# Patient Record
Sex: FEMALE | Race: WHITE
[De-identification: names, ages, dates, MRNs, and addresses within clinical notes are randomized per-mention and may not be internally consistent; named-entity substitution may affect disease eponyms.]

---

## 2017-01-12 ENCOUNTER — HOSPITAL ENCOUNTER (OUTPATIENT)
Dept: HOSPITAL 45 - C.LABBC | Age: 62
Discharge: HOME | End: 2017-01-12
Attending: INTERNAL MEDICINE
Payer: COMMERCIAL

## 2017-01-12 DIAGNOSIS — I10: Primary | ICD-10-CM

## 2017-01-12 LAB
ANION GAP SERPL CALC-SCNC: 14 MMOL/L (ref 3–11)
BUN SERPL-MCNC: 15 MG/DL (ref 7–18)
BUN/CREAT SERPL: 16.5 (ref 10–20)
CALCIUM SERPL-MCNC: 9.5 MG/DL (ref 8.5–10.1)
CHLORIDE SERPL-SCNC: 87 MMOL/L (ref 98–107)
CO2 SERPL-SCNC: 28 MMOL/L (ref 21–32)
CREAT SERPL-MCNC: 0.91 MG/DL (ref 0.6–1.2)
GLUCOSE SERPL-MCNC: 99 MG/DL (ref 70–99)
POTASSIUM SERPL-SCNC: 3.8 MMOL/L (ref 3.5–5.1)
SODIUM SERPL-SCNC: 129 MMOL/L (ref 136–145)

## 2017-01-20 ENCOUNTER — HOSPITAL ENCOUNTER (OUTPATIENT)
Dept: HOSPITAL 45 - C.LABBC | Age: 62
Discharge: HOME | End: 2017-01-20
Attending: INTERNAL MEDICINE
Payer: COMMERCIAL

## 2017-01-20 DIAGNOSIS — I10: Primary | ICD-10-CM

## 2017-01-20 LAB
ANION GAP SERPL CALC-SCNC: 12 MMOL/L (ref 3–11)
BUN SERPL-MCNC: 11 MG/DL (ref 7–18)
BUN/CREAT SERPL: 14.9 (ref 10–20)
CALCIUM SERPL-MCNC: 8.7 MG/DL (ref 8.5–10.1)
CHLORIDE SERPL-SCNC: 99 MMOL/L (ref 98–107)
CO2 SERPL-SCNC: 26 MMOL/L (ref 21–32)
CREAT SERPL-MCNC: 0.75 MG/DL (ref 0.6–1.2)
GLUCOSE SERPL-MCNC: 157 MG/DL (ref 70–99)
POTASSIUM SERPL-SCNC: 3.5 MMOL/L (ref 3.5–5.1)
SODIUM SERPL-SCNC: 137 MMOL/L (ref 136–145)

## 2017-04-20 ENCOUNTER — HOSPITAL ENCOUNTER (OUTPATIENT)
Dept: HOSPITAL 45 - C.LABBC | Age: 62
Discharge: HOME | End: 2017-04-20
Attending: INTERNAL MEDICINE
Payer: COMMERCIAL

## 2017-04-20 DIAGNOSIS — I48.0: ICD-10-CM

## 2017-04-20 DIAGNOSIS — F32.9: ICD-10-CM

## 2017-04-20 DIAGNOSIS — R73.9: ICD-10-CM

## 2017-04-20 DIAGNOSIS — F10.10: ICD-10-CM

## 2017-04-20 DIAGNOSIS — Z79.899: ICD-10-CM

## 2017-04-20 DIAGNOSIS — I10: Primary | ICD-10-CM

## 2017-04-20 DIAGNOSIS — M48.06: ICD-10-CM

## 2017-04-20 LAB
ALT SERPL-CCNC: 23 U/L (ref 12–78)
ANION GAP SERPL CALC-SCNC: 11 MMOL/L (ref 3–11)
AST SERPL-CCNC: 31 U/L (ref 15–37)
BASOPHILS # BLD: 0.03 K/UL (ref 0–0.2)
BASOPHILS NFR BLD: 0.5 %
BUN SERPL-MCNC: 7 MG/DL (ref 7–18)
BUN/CREAT SERPL: 12.2 (ref 10–20)
CALCIUM SERPL-MCNC: 9.1 MG/DL (ref 8.5–10.1)
CHLORIDE SERPL-SCNC: 99 MMOL/L (ref 98–107)
CO2 SERPL-SCNC: 25 MMOL/L (ref 21–32)
COMPLETE: YES
CREAT SERPL-MCNC: 0.55 MG/DL (ref 0.6–1.2)
EOSINOPHIL NFR BLD AUTO: 257 K/UL (ref 130–400)
GLUCOSE SERPL-MCNC: 88 MG/DL (ref 70–99)
HCT VFR BLD CALC: 36.8 % (ref 37–47)
IG%: 0.2 %
IMM GRANULOCYTES NFR BLD AUTO: 32.4 %
LYMPHOCYTES # BLD: 1.79 K/UL (ref 1.2–3.4)
MCH RBC QN AUTO: 34.3 PG (ref 25–34)
MCHC RBC AUTO-ENTMCNC: 34.2 G/DL (ref 32–36)
MCV RBC AUTO: 100.3 FL (ref 80–100)
MONOCYTES NFR BLD: 8.9 %
NEUTROPHILS # BLD AUTO: 1.1 %
NEUTROPHILS NFR BLD AUTO: 56.9 %
PMV BLD AUTO: 9.4 FL (ref 7.4–10.4)
POTASSIUM SERPL-SCNC: 3.6 MMOL/L (ref 3.5–5.1)
RBC # BLD AUTO: 3.67 M/UL (ref 4.2–5.4)
SODIUM SERPL-SCNC: 135 MMOL/L (ref 136–145)
TSH SERPL-ACNC: 0.74 UIU/ML (ref 0.3–4.5)
WBC # BLD AUTO: 5.52 K/UL (ref 4.8–10.8)

## 2017-07-11 ENCOUNTER — HOSPITAL ENCOUNTER (OUTPATIENT)
Dept: HOSPITAL 45 - C.MRIBC | Age: 62
Discharge: HOME | End: 2017-07-11
Attending: PSYCHIATRY & NEUROLOGY
Payer: COMMERCIAL

## 2017-07-11 DIAGNOSIS — I67.1: Primary | ICD-10-CM

## 2017-07-11 LAB
ANION GAP SERPL CALC-SCNC: 10 MMOL/L (ref 3–11)
BUN SERPL-MCNC: 9 MG/DL (ref 7–18)
BUN/CREAT SERPL: 12.3 (ref 10–20)
CALCIUM SERPL-MCNC: 9.5 MG/DL (ref 8.5–10.1)
CHLORIDE SERPL-SCNC: 97 MMOL/L (ref 98–107)
CO2 SERPL-SCNC: 27 MMOL/L (ref 21–32)
CREAT SERPL-MCNC: 0.72 MG/DL (ref 0.6–1.2)
GLUCOSE SERPL-MCNC: 76 MG/DL (ref 70–99)
POTASSIUM SERPL-SCNC: 3.5 MMOL/L (ref 3.5–5.1)
SODIUM SERPL-SCNC: 134 MMOL/L (ref 136–145)

## 2017-07-11 NOTE — DIAGNOSTIC IMAGING REPORT
MR ANGIOGRAM OF THE BRAIN



CLINICAL HISTORY:  Aneurysm.



COMPARISON STUDY: MR angiogram of the brain dated 11/20/2015.



TECHNIQUE: 3-D time-of-flight MR angiography of the intracranial circulation is

performed. 3-D tumble views are created and assessed. IV contrast was not

administered for this examination. The examination is modestly degraded by

motion artifact.



FINDINGS: There are large bilateral posterior communicating arteries with fetal

origin of the left posterior cerebral artery. The internal carotid arteries are

widely patent bilaterally, as are the anterior and middle cerebral arteries. The

vertebrobasilar system is diminutive. The vertebral arteries arteries and

basilar artery are diminutive. The posterior cerebral arteries are widely

patent. The vertebral arteries are codominant. There is a 5 mm aneurysm

identified arising from the supraclinoid left internal carotid artery on image

#147. No additional aneurysm is seen. There is no high-grade stenosis are focal

vessel cutoff seen throughout the intracranial circulation. The brain parenchyma

is normal as visualized.



IMPRESSION:



1. There is a 5 mm aneurysm identified arising from the supraclinoid left

internal carotid artery. This is similar in appearance to 11/20/2015.



2. No additional aneurysm is seen. The MR angiogram of the brain is otherwise

normal in appearance.







Electronically signed by:  Ruel Capellan M.D.

7/11/2017 11:27 AM



Dictated Date/Time:  7/11/2017 11:20 AM

## 2017-10-24 ENCOUNTER — HOSPITAL ENCOUNTER (OUTPATIENT)
Dept: HOSPITAL 45 - C.MAMM | Age: 62
Discharge: HOME | End: 2017-10-24
Attending: INTERNAL MEDICINE
Payer: COMMERCIAL

## 2017-10-24 DIAGNOSIS — Z12.31: Primary | ICD-10-CM
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
## 2017-10-24 DIAGNOSIS — S82.409A: Primary | ICD-10-CM

## 2017-10-24 NOTE — MAMMOGRAPHY REPORT
BILATERAL DIGITAL SCREENING MAMMOGRAM WITH CAD: 10/24/2017

CLINICAL HISTORY: Routine screening.  Patient has no complaints.  





TECHNIQUE: Bilateral CC and MLO views were obtained.  Current study was also evaluated with a Compute
r Aided Detection (CAD) system.  



COMPARISON: Comparison is made to exams dated:  7/27/2011 mammogram, 7/1/2010 ultrasound, 7/1/2010 ma
mmogram, 3/12/2010 mammogram - [facility], 6/20/2007, and 12/28/2004 mammogram - [facility].   



BREAST COMPOSITION:  There are scattered areas of fibroglandular density in both breasts.  



FINDINGS:  There is fluctuating nodularity in the breasts, most likely representing fluctuating cysts
. No suspicious spiculated or irregular mass, architectural distortion or cluster of suspicious micro
calcifications is seen.  



IMPRESSION:  ACR BI-RADS CATEGORY 2: BENIGN

There is no mammographic evidence of malignancy. A 1 year screening mammogram is recommended.  The pa
tient will receive written notification of the results.  





Approximately 10% of breast cancers are not detected with mammography. A negative mammographic report
 should not delay biopsy if a clinically suggestive mass is present.



Anastasia Shi M.D.          

ay/:10/24/2017 14:48:45  



Imaging Technologist: Pretty SHIPLEY(R)(M), [facility]

letter sent: Normal 1/2  

BI-RADS Code: ACR BI-RADS Category 2: Benign

## 2017-10-27 ENCOUNTER — HOSPITAL ENCOUNTER (OUTPATIENT)
Dept: HOSPITAL 45 - C.RDSM | Age: 62
Discharge: HOME | End: 2017-10-27
Attending: PHYSICIAN ASSISTANT
Payer: COMMERCIAL

## 2017-10-27 DIAGNOSIS — M25.561: Primary | ICD-10-CM

## 2017-10-27 DIAGNOSIS — M25.562: ICD-10-CM

## 2017-10-27 NOTE — DIAGNOSTIC IMAGING REPORT
R KNEE 1 OR 2 VIEWS



HISTORY:  62 years-old Female BILATERAL KNEE PAIN acute bilateral knee pain



COMPARISON: Right knee radiographs 3/8/2016



TECHNIQUE: AP and sunrise views of the bilateral knees with crosstable view of

the left knee.



FINDINGS: 

Unchanged right knee total joint arthroplasty and patellar resurfacing. No

evidence of hardware complication. Small-to-moderate right knee joint effusion

with corticated linear ossification within the region of the suprapatellar

space. Small to moderate left knee joint effusion also noted. Moderate medial

and lateral compartment osteoarthritis with severe patellofemoral compartment

osteoarthritis on the left.



IMPRESSION: 

1. Small to moderate bilateral joint effusions without acute fracture or

dislocation. 

2. Hinged right knee total joint arthroplasty without complication.

3. Tricompartmental osteoarthritis of the left knee, severe within the

patellofemoral joint.







The above report was generated using voice recognition software. It may contain

grammatical, syntax or spelling errors.







Electronically signed by:  Noman Villalba M.D.

10/27/2017 8:51 AM



Dictated Date/Time:  10/27/2017 8:48 AM

## 2017-11-20 ENCOUNTER — HOSPITAL ENCOUNTER (OUTPATIENT)
Dept: HOSPITAL 45 - C.LABBC | Age: 62
Discharge: HOME | End: 2017-11-20
Attending: INTERNAL MEDICINE
Payer: COMMERCIAL

## 2017-11-20 DIAGNOSIS — I48.0: ICD-10-CM

## 2017-11-20 DIAGNOSIS — Z51.81: Primary | ICD-10-CM

## 2017-11-20 DIAGNOSIS — Z00.00: ICD-10-CM

## 2017-11-20 DIAGNOSIS — Z79.899: ICD-10-CM

## 2017-11-20 DIAGNOSIS — Z86.79: ICD-10-CM

## 2017-11-20 DIAGNOSIS — I10: ICD-10-CM

## 2017-11-20 DIAGNOSIS — I34.0: ICD-10-CM

## 2017-11-20 LAB
ALT SERPL-CCNC: 27 U/L (ref 12–78)
ANION GAP SERPL CALC-SCNC: 10 MMOL/L (ref 3–11)
AST SERPL-CCNC: 36 U/L (ref 15–37)
BUN SERPL-MCNC: 8 MG/DL (ref 7–18)
BUN/CREAT SERPL: 9.2 (ref 10–20)
CALCIUM SERPL-MCNC: 9.8 MG/DL (ref 8.5–10.1)
CHLORIDE SERPL-SCNC: 96 MMOL/L (ref 98–107)
CO2 SERPL-SCNC: 26 MMOL/L (ref 21–32)
CREAT SERPL-MCNC: 0.91 MG/DL (ref 0.6–1.2)
EOSINOPHIL NFR BLD AUTO: 269 K/UL (ref 130–400)
GLUCOSE SERPL-MCNC: 92 MG/DL (ref 70–99)
HCT VFR BLD CALC: 42.9 % (ref 37–47)
MCH RBC QN AUTO: 34 PG (ref 25–34)
MCHC RBC AUTO-ENTMCNC: 34 G/DL (ref 32–36)
MCV RBC AUTO: 99.8 FL (ref 80–100)
PMV BLD AUTO: 9.6 FL (ref 7.4–10.4)
POTASSIUM SERPL-SCNC: 3.9 MMOL/L (ref 3.5–5.1)
RBC # BLD AUTO: 4.3 M/UL (ref 4.2–5.4)
SODIUM SERPL-SCNC: 132 MMOL/L (ref 136–145)
TSH SERPL-ACNC: 2.33 UIU/ML (ref 0.3–4.5)
WBC # BLD AUTO: 9.25 K/UL (ref 4.8–10.8)

## 2018-03-16 ENCOUNTER — HOSPITAL ENCOUNTER (OUTPATIENT)
Dept: HOSPITAL 45 - C.LABBC | Age: 63
Discharge: HOME | End: 2018-03-16
Attending: FAMILY MEDICINE
Payer: COMMERCIAL

## 2018-03-16 DIAGNOSIS — R53.83: ICD-10-CM

## 2018-03-16 DIAGNOSIS — R42: ICD-10-CM

## 2018-03-16 DIAGNOSIS — R53.81: Primary | ICD-10-CM

## 2018-03-16 LAB
BASOPHILS # BLD: 0.03 K/UL (ref 0–0.2)
BASOPHILS NFR BLD: 0.3 %
BUN SERPL-MCNC: 15 MG/DL (ref 7–18)
CALCIUM SERPL-MCNC: 10.1 MG/DL (ref 8.5–10.1)
CO2 SERPL-SCNC: 26 MMOL/L (ref 21–32)
CREAT SERPL-MCNC: 1.36 MG/DL (ref 0.6–1.2)
EOS ABS #: 0.03 K/UL (ref 0–0.5)
EOSINOPHIL NFR BLD AUTO: 253 K/UL (ref 130–400)
GLUCOSE SERPL-MCNC: 126 MG/DL (ref 70–99)
HCT VFR BLD CALC: 45 % (ref 37–47)
HGB BLD-MCNC: 16.1 G/DL (ref 12–16)
IG#: 0.04 K/UL (ref 0–0.02)
IMM GRANULOCYTES NFR BLD AUTO: 10.3 %
LYMPHOCYTES # BLD: 0.98 K/UL (ref 1.2–3.4)
MCH RBC QN AUTO: 34.4 PG (ref 25–34)
MCHC RBC AUTO-ENTMCNC: 35.8 G/DL (ref 32–36)
MCV RBC AUTO: 96.2 FL (ref 80–100)
MONO ABS #: 0.92 K/UL (ref 0.11–0.59)
MONOCYTES NFR BLD: 9.7 %
NEUT ABS #: 7.53 K/UL (ref 1.4–6.5)
NEUTROPHILS # BLD AUTO: 0.3 %
NEUTROPHILS NFR BLD AUTO: 79 %
PMV BLD AUTO: 9.3 FL (ref 7.4–10.4)
POTASSIUM SERPL-SCNC: 3.8 MMOL/L (ref 3.5–5.1)
RED CELL DISTRIBUTION WIDTH CV: 14.6 % (ref 11.5–14.5)
RED CELL DISTRIBUTION WIDTH SD: 49.8 FL (ref 36.4–46.3)
SODIUM SERPL-SCNC: 127 MMOL/L (ref 136–145)
WBC # BLD AUTO: 9.53 K/UL (ref 4.8–10.8)

## 2018-03-19 ENCOUNTER — HOSPITAL ENCOUNTER (OUTPATIENT)
Dept: HOSPITAL 45 - C.LAB1850 | Age: 63
Discharge: HOME | End: 2018-03-19
Attending: FAMILY MEDICINE
Payer: COMMERCIAL

## 2018-03-19 DIAGNOSIS — E87.1: Primary | ICD-10-CM

## 2018-03-19 LAB
ALBUMIN SERPL-MCNC: 3.9 GM/DL (ref 3.4–5)
BUN SERPL-MCNC: 19 MG/DL (ref 7–18)
CALCIUM SERPL-MCNC: 9.7 MG/DL (ref 8.5–10.1)
CO2 SERPL-SCNC: 26 MMOL/L (ref 21–32)
CREAT SERPL-MCNC: 0.95 MG/DL (ref 0.6–1.2)
GLUCOSE SERPL-MCNC: 117 MG/DL (ref 70–99)
PHOSPHATE SERPL-MCNC: 3.3 MG/DL (ref 2.5–4.9)
POTASSIUM SERPL-SCNC: 5.1 MMOL/L (ref 3.5–5.1)
SODIUM SERPL-SCNC: 132 MMOL/L (ref 136–145)

## 2018-04-20 ENCOUNTER — HOSPITAL ENCOUNTER (EMERGENCY)
Facility: HOSPITAL | Age: 63
Discharge: HOME | End: 2018-04-20
Attending: EMERGENCY MEDICINE | Admitting: EMERGENCY MEDICINE
Payer: COMMERCIAL

## 2018-04-20 ENCOUNTER — APPOINTMENT (EMERGENCY)
Dept: RADIOLOGY | Facility: HOSPITAL | Age: 63
End: 2018-04-20
Payer: COMMERCIAL

## 2018-04-20 VITALS
TEMPERATURE: 97.6 F | SYSTOLIC BLOOD PRESSURE: 187 MMHG | RESPIRATION RATE: 20 BRPM | DIASTOLIC BLOOD PRESSURE: 118 MMHG | HEART RATE: 118 BPM | WEIGHT: 172 LBS | OXYGEN SATURATION: 92 %

## 2018-04-20 DIAGNOSIS — R06.02 SHORTNESS OF BREATH: Primary | ICD-10-CM

## 2018-04-20 DIAGNOSIS — I10 HYPERTENSION: ICD-10-CM

## 2018-04-20 DIAGNOSIS — J44.1 COPD EXACERBATION (HCC): ICD-10-CM

## 2018-04-20 LAB
ALBUMIN SERPL BCP-MCNC: 3.6 G/DL (ref 3.5–5)
ALP SERPL-CCNC: 139 U/L (ref 46–116)
ALT SERPL W P-5'-P-CCNC: 25 U/L (ref 12–78)
ANION GAP SERPL CALCULATED.3IONS-SCNC: 12 MMOL/L (ref 4–13)
APTT PPP: 29 SECONDS (ref 23–35)
AST SERPL W P-5'-P-CCNC: 41 U/L (ref 5–45)
BASOPHILS # BLD AUTO: 0.04 THOUSANDS/ΜL (ref 0–0.1)
BASOPHILS NFR BLD AUTO: 0 % (ref 0–1)
BILIRUB SERPL-MCNC: 0.48 MG/DL (ref 0.2–1)
BUN SERPL-MCNC: 17 MG/DL (ref 5–25)
CALCIUM SERPL-MCNC: 8.7 MG/DL (ref 8.3–10.1)
CHLORIDE SERPL-SCNC: 99 MMOL/L (ref 100–108)
CO2 SERPL-SCNC: 24 MMOL/L (ref 21–32)
CREAT SERPL-MCNC: 0.87 MG/DL (ref 0.6–1.3)
EOSINOPHIL # BLD AUTO: 0.05 THOUSAND/ΜL (ref 0–0.61)
EOSINOPHIL NFR BLD AUTO: 0 % (ref 0–6)
ERYTHROCYTE [DISTWIDTH] IN BLOOD BY AUTOMATED COUNT: 15.1 % (ref 11.6–15.1)
GFR SERPL CREATININE-BSD FRML MDRD: 71 ML/MIN/1.73SQ M
GLUCOSE SERPL-MCNC: 105 MG/DL (ref 65–140)
HCT VFR BLD AUTO: 38.7 % (ref 34.8–46.1)
HGB BLD-MCNC: 13.1 G/DL (ref 11.5–15.4)
INR PPP: 1.09 (ref 0.86–1.16)
LYMPHOCYTES # BLD AUTO: 0.96 THOUSANDS/ΜL (ref 0.6–4.47)
LYMPHOCYTES NFR BLD AUTO: 8 % (ref 14–44)
MAGNESIUM SERPL-MCNC: 1.6 MG/DL (ref 1.6–2.6)
MCH RBC QN AUTO: 34.6 PG (ref 26.8–34.3)
MCHC RBC AUTO-ENTMCNC: 33.9 G/DL (ref 31.4–37.4)
MCV RBC AUTO: 102 FL (ref 82–98)
MONOCYTES # BLD AUTO: 0.75 THOUSAND/ΜL (ref 0.17–1.22)
MONOCYTES NFR BLD AUTO: 6 % (ref 4–12)
NEUTROPHILS # BLD AUTO: 9.84 THOUSANDS/ΜL (ref 1.85–7.62)
NEUTS SEG NFR BLD AUTO: 86 % (ref 43–75)
NRBC BLD AUTO-RTO: 0 /100 WBCS
NT-PROBNP SERPL-MCNC: 2624 PG/ML
PLATELET # BLD AUTO: 256 THOUSANDS/UL (ref 149–390)
PMV BLD AUTO: 9.7 FL (ref 8.9–12.7)
POTASSIUM SERPL-SCNC: 3.8 MMOL/L (ref 3.5–5.3)
PROT SERPL-MCNC: 7.1 G/DL (ref 6.4–8.2)
PROTHROMBIN TIME: 14.1 SECONDS (ref 12.1–14.4)
RBC # BLD AUTO: 3.79 MILLION/UL (ref 3.81–5.12)
SODIUM SERPL-SCNC: 135 MMOL/L (ref 136–145)
TROPONIN I SERPL-MCNC: <0.02 NG/ML
WBC # BLD AUTO: 11.64 THOUSAND/UL (ref 4.31–10.16)

## 2018-04-20 PROCEDURE — 71046 X-RAY EXAM CHEST 2 VIEWS: CPT

## 2018-04-20 PROCEDURE — 99285 EMERGENCY DEPT VISIT HI MDM: CPT

## 2018-04-20 PROCEDURE — 84484 ASSAY OF TROPONIN QUANT: CPT | Performed by: EMERGENCY MEDICINE

## 2018-04-20 PROCEDURE — 36415 COLL VENOUS BLD VENIPUNCTURE: CPT | Performed by: EMERGENCY MEDICINE

## 2018-04-20 PROCEDURE — 83735 ASSAY OF MAGNESIUM: CPT | Performed by: EMERGENCY MEDICINE

## 2018-04-20 PROCEDURE — 93005 ELECTROCARDIOGRAM TRACING: CPT

## 2018-04-20 PROCEDURE — 83880 ASSAY OF NATRIURETIC PEPTIDE: CPT | Performed by: EMERGENCY MEDICINE

## 2018-04-20 PROCEDURE — 80053 COMPREHEN METABOLIC PANEL: CPT | Performed by: EMERGENCY MEDICINE

## 2018-04-20 PROCEDURE — 85610 PROTHROMBIN TIME: CPT | Performed by: EMERGENCY MEDICINE

## 2018-04-20 PROCEDURE — 94640 AIRWAY INHALATION TREATMENT: CPT

## 2018-04-20 PROCEDURE — 85025 COMPLETE CBC W/AUTO DIFF WBC: CPT | Performed by: EMERGENCY MEDICINE

## 2018-04-20 PROCEDURE — 85730 THROMBOPLASTIN TIME PARTIAL: CPT | Performed by: EMERGENCY MEDICINE

## 2018-04-20 RX ORDER — IPRATROPIUM BROMIDE AND ALBUTEROL SULFATE 2.5; .5 MG/3ML; MG/3ML
3 SOLUTION RESPIRATORY (INHALATION) ONCE
Status: COMPLETED | OUTPATIENT
Start: 2018-04-20 | End: 2018-04-20

## 2018-04-20 RX ORDER — ALBUTEROL SULFATE 90 UG/1
2 AEROSOL, METERED RESPIRATORY (INHALATION) ONCE
Status: COMPLETED | OUTPATIENT
Start: 2018-04-20 | End: 2018-04-20

## 2018-04-20 RX ADMIN — IPRATROPIUM BROMIDE AND ALBUTEROL SULFATE 3 ML: .5; 3 SOLUTION RESPIRATORY (INHALATION) at 21:54

## 2018-04-20 RX ADMIN — DEXAMETHASONE SODIUM PHOSPHATE 10 MG: 10 INJECTION, SOLUTION INTRAMUSCULAR; INTRAVENOUS at 22:48

## 2018-04-20 RX ADMIN — ALBUTEROL SULFATE 2 PUFF: 90 AEROSOL, METERED RESPIRATORY (INHALATION) at 22:49

## 2018-04-21 LAB
ATRIAL RATE: 140 BPM
P AXIS: 200 DEGREES
QRS AXIS: 81 DEGREES
QRSD INTERVAL: 84 MS
QT INTERVAL: 332 MS
QTC INTERVAL: 447 MS
T WAVE AXIS: 40 DEGREES
VENTRICULAR RATE: 109 BPM

## 2018-04-21 PROCEDURE — 93010 ELECTROCARDIOGRAM REPORT: CPT | Performed by: INTERNAL MEDICINE

## 2018-04-21 NOTE — DISCHARGE INSTRUCTIONS
COPD (Chronic Obstructive Pulmonary Disease)   WHAT YOU NEED TO KNOW:   Chronic obstructive pulmonary disease (COPD) is a lung disease that makes it hard for you to breathe  It is usually a result of lung damage caused by years of irritation and inflammation in your lungs  DISCHARGE INSTRUCTIONS:   Call 911 if:   · You feel lightheaded, short of breath, and have chest pain  Return to the emergency department if:   · You are confused, dizzy, or feel faint  · Your arm or leg feels warm, tender, and painful  It may look swollen and red  · You cough up blood  Contact your healthcare provider if:   · You have more shortness of breath than usual      · You need more medicine than usual to control your symptoms  · You are coughing or wheezing more than usual      · You are coughing up more mucus, or it is a different color or has a different odor  · You gain more than 3 pounds in a week  · You have a fever, a runny or stuffy nose, and a sore throat, or other cold or flu symptoms  · Your skin, lips, or nails start to turn blue  · You have swelling in your legs or ankles  · You are very tired or weak for more than a day  · You notice changes in your mood, or changes in your ability to think or concentrate  · You have questions or concerns about your condition or care  Medicines:   · Medicines  may be used to open your airways, decrease swelling and inflammation in your lungs, or treat an infection  You may need 2 or more medicines  A short-acting medicine relieves symptoms quickly  Long-acting medicines will control or prevent symptoms  Ask for more information about the medicines you are given and how to use them safely  · Take your medicine as directed  Contact your healthcare provider if you think your medicine is not helping or if you have side effects  Tell him or her if you are allergic to any medicine  Keep a list of the medicines, vitamins, and herbs you take  Include the amounts, and when and why you take them  Bring the list or the pill bottles to follow-up visits  Carry your medicine list with you in case of an emergency  Help make breathing easier:   · Use pursed-lip breathing any time you feel short of breath  Take a deep breath in through your nose  Slowly breathe out through your mouth with your lips pursed for twice as long as you inhaled  You can also practice this breathing pattern while you bend, lift, climb stairs, or exercise  It slows down your breathing and helps move more air in and out of your lungs  · Do not smoke, and avoid others who smoke  Nicotine and other substances can cause lung irritation or damage and make it harder for you to breathe  Do not use e-cigarettes or smokeless tobacco  They still contain nicotine  Ask your healthcare provider for information if you currently smoke and need help to quit  For support and more information:  ¨ [employer]  Phone: 4- 235 - 032-1674  Web Address: [employer]      · Be aware of and avoid anything that makes your symptoms worse  Stay out of high altitudes and places with high humidity  Stay inside, or cover your mouth and nose with a scarf when you are outside during cold weather  Stay inside on days when air pollution or pollen counts are high  Do not use aerosol sprays such as deodorant, bug spray, and hair spray  Manage COPD and help prevent exacerbations:  COPD is a serious condition that gets worse over time  A COPD exacerbation means your symptoms suddenly get worse  It is important to prevent exacerbations  An exacerbation can cause more lung damage  COPD cannot be cured, but you can take action to feel better and prevent COPD exacerbations:  · Protect yourself from germs  Germs can get into your lungs and cause an infection  An infection in your lungs can create more mucus and make it harder to breathe   An infection can also create swelling in your airways and prevent air from getting in  You can decrease your risk for infection by doing the following:     [facility] your hands often with soap and water  Carry germ-killing gel with you  You can use the gel to clean your hands when soap and water are not available  ¨ Do not touch your eyes, nose, or mouth unless you have washed your hands first      ¨ Always cover your mouth when you cough  Cough into a tissue or your shirtsleeve so you do not spread germs from your hands  ¨ Try to avoid people who have a cold or the flu  If you are sick, stay away from others as much as possible  · Drink more liquids  This will help to keep your air passages moist and help you cough up mucus  Ask how much liquid to drink each day and which liquids are best for you  · Exercise daily  Exercise for at least 20 minutes each day to help increase your energy and decrease shortness of breath  Walking or riding a bike are good ways to exercise  Talk to your healthcare provider about the best exercise plan for you  · Ask about vaccines  Your healthcare provider may recommend that you get regular flu and pneumonia vaccines  Pneumonia can become life-threatening for a person who has COPD  Ask about other vaccines you may need  Ask your healthcare provider about the flu and pneumonia vaccines  All adults should get the flu (influenza) vaccine every year as soon as it becomes available  The pneumonia vaccine is given to adults aged 72 or older to prevent pneumococcal disease, such as pneumonia  Adults aged 23 to 59 years who are at high risk for pneumococcal disease also should get the pneumococcal vaccine  It may need to be repeated 1 or 5 years later  Pulmonary rehabilitation:  Your healthcare provider may recommend a program to help you manage your symptoms and improve your quality of life  It may include nutritional counseling and exercise to strengthen your lungs     Make decisions about your choices for future treatment:  Ask for information about advanced medical directives and living guaman  These documents help you decide and write down your choices for treatment and end-of-life care  It is best to complete them when you feel well and can think clearly about your wishes  The information can then be kept for future use if you are in the hospital or become very ill  Follow up with your healthcare provider as directed: You may need more tests  Your healthcare provider may refer you to a pulmonary (lung) specialist  Write down your questions so you remember to ask them during your visits  © 2017 [facility] Information is for End User's use only and may not be sold, redistributed or otherwise used for commercial purposes  All illustrations and images included in CareNotes® are the copyrighted property of [employer] A M [employer] Inc  or Leob[employer] Hurd  The above information is an  only  It is not intended as medical advice for individual conditions or treatments  Talk to your doctor, nurse or pharmacist before following any medical regimen to see if it is safe and effective for you  COPD (Chronic Obstructive Pulmonary Disease)   WHAT YOU NEED TO KNOW:   Chronic obstructive pulmonary disease (COPD) is a lung disease that makes it hard for you to breathe  It is usually a result of lung damage caused by years of irritation and inflammation in your lungs  DISCHARGE INSTRUCTIONS:   Call 911 if:   · You feel lightheaded, short of breath, and have chest pain  Seek care immediately if:   · You are confused, dizzy, or feel faint  · Your arm or leg feels warm, tender, and painful  It may look swollen and red  · You cough up blood  Contact your healthcare provider if:   · You have more shortness of breath than usual      · You need more medicine than usual to control your symptoms       · You are coughing or wheezing more than usual      · You are coughing up more mucus, or it is a different color or has a different odor  · You gain more than 3 pounds in a week  · You have a fever, a runny or stuffy nose, and a sore throat, or other cold or flu symptoms  · Your skin, lips, or nails start to turn blue  · You have swelling in your legs or ankles  · You are very tired or weak for more than a day  · You notice changes in your mood, or changes in your ability to think or concentrate  · You have questions or concerns about your condition or care  Medicines:   · Medicines  may be used to open your airways, decrease swelling and inflammation in your lungs, or treat an infection  You may need 2 or more medicines  A short-acting medicine relieves symptoms quickly  Long-acting medicines will control or prevent symptoms  Ask for more information about the medicines you are given and how to use them safely  · Take your medicine as directed  Contact your healthcare provider if you think your medicine is not helping or if you have side effects  Tell him or her if you are allergic to any medicine  Keep a list of the medicines, vitamins, and herbs you take  Include the amounts, and when and why you take them  Bring the list or the pill bottles to follow-up visits  Carry your medicine list with you in case of an emergency  Help make breathing easier:   · Use pursed-lip breathing any time you feel short of breath  Take a deep breath in through your nose  Slowly breathe out through your mouth with your lips pursed for twice as long as you inhaled  You can also practice this breathing pattern while you bend, lift, climb stairs, or exercise  It slows down your breathing and helps move more air in and out of your lungs  · Do not smoke, and avoid others who smoke  Nicotine and other substances can cause lung irritation or damage and make it harder for you to breathe  Do not use e-cigarettes or smokeless tobacco  They still contain nicotine   Ask your healthcare provider for information if you currently smoke and need help to quit  For support and more information:  [employer]  Phone: 1- 783 - 278-0904  Web Address: [employer]      · Be aware of and avoid anything that makes your symptoms worse  Stay out of high altitudes and places with high humidity  Stay inside, or cover your mouth and nose with a scarf when you are outside during cold weather  Stay inside on days when air pollution or pollen counts are high  Do not use aerosol sprays such as deodorant, bug spray, and hair spray  Manage COPD and help prevent exacerbations:  COPD is a serious condition that gets worse over time  A COPD exacerbation means your symptoms suddenly get worse  It is important to prevent exacerbations  An exacerbation can cause more lung damage  COPD cannot be cured, but you can take action to feel better and prevent COPD exacerbations:  · Protect yourself from germs  Germs can get into your lungs and cause an infection  An infection in your lungs can create more mucus and make it harder to breathe  An infection can also create swelling in your airways and prevent air from getting in  You can decrease your risk for infection by doing the following:     [facility] your hands often with soap and water  Carry germ-killing gel with you  You can use the gel to clean your hands when soap and water are not available  ¨ Do not touch your eyes, nose, or mouth unless you have washed your hands first      ¨ Always cover your mouth when you cough  Cough into a tissue or your shirtsleeve so you do not spread germs from your hands  ¨ Try to avoid people who have a cold or the flu  If you are sick, stay away from others as much as possible  · Drink more liquids  This will help to keep your air passages moist and help you cough up mucus  Ask how much liquid to drink each day and which liquids are best for you  · Exercise daily    Exercise for at least 20 minutes each day to help increase your energy and decrease shortness of breath  Walking or riding a bike are good ways to exercise  Talk to your healthcare provider about the best exercise plan for you  · Ask about vaccines  Your healthcare provider may recommend that you get regular flu and pneumonia vaccines  Pneumonia can become life-threatening for a person who has COPD  Ask about other vaccines you may need  Ask your healthcare provider about the flu and pneumonia vaccines  All adults should get the flu (influenza) vaccine every year as soon as it becomes available  The pneumonia vaccine is given to adults aged 72 or older to prevent pneumococcal disease, such as pneumonia  Adults aged 23 to 59 years who are at high risk for pneumococcal disease also should get the pneumococcal vaccine  It may need to be repeated 1 or 5 years later  Pulmonary rehabilitation:  Your healthcare provider may recommend a program to help you manage your symptoms and improve your quality of life  It may include nutritional counseling and exercise to strengthen your lungs  Make decisions about your choices for future treatment:  Ask for information about advanced medical directives and living guaman  These documents help you decide and write down your choices for treatment and end-of-life care  It is best to complete them when you feel well and can think clearly about your wishes  The information can then be kept for future use if you are in the hospital or become very ill  Follow up with your healthcare provider as directed: You may need more tests  Your healthcare provider may refer you to a pulmonary (lung) specialist  Write down your questions so you remember to ask them during your visits  © 2017 [address] [employer] Information is for End User's use only and may not be sold, redistributed or otherwise used for commercial purposes  All illustrations and images included in CareNotes® are the copyrighted property of [employer]  or Leobardo Hurd    The above information is an  only  It is not intended as medical advice for individual conditions or treatments  Talk to your doctor, nurse or pharmacist before following any medical regimen to see if it is safe and effective for you  Shortness of Breath   WHAT YOU NEED TO KNOW:   What is shortness of breath? Shortness of breath is a feeling that you cannot get enough air when you breathe in  You may have this feeling only during activity, or all the time  Your symptoms can range from mild to severe  Shortness of breath may be a sign of a serious health condition that needs immediate care  What causes or increases my risk for shortness of breath? · A lung condition, such as pneumonia, asthma, or a blood clot in your lung    · Heart, kidney, or liver disease    · Lung cancer or lung damage, such as from asbestos    · Smoking cigarettes    · Anxiety or a panic attack    · Physical activity such as running or climbing stairs, especially if you are out of shape    · Being overweight    · Travel to high altitude    · Anemia (low red blood cell count)  How is the cause of shortness of breath diagnosed? Your healthcare provider will listen to your breathing and check for signs of a serious health condition  Signs include blue lips or fingernails or chest pain that happens with shortness of breath  Tell your provider if shortness of breath keeps you from walking or talking easily  Your provider will also check your memory and alertness  Tell your provider when your shortness of breath started and how severe it is  Describe anything that starts your symptoms, such as physical activity  Also tell your provider anything you do to make breathing easier  You may also need any of the following:  · Blood tests  may be used to check your oxygen level or to find health conditions such as anemia  Anemia is too few red blood cells (RBCs)  RBCs carry oxygen throughout your body   Blood tests may also be used to check for signs of infection or organ failure  · A pulse oximeter  is a device that measures the amount of oxygen in your blood  A cord with a clip or sticky strip is placed on your finger, ear, or toe  The other end of the cord is hooked to a machine  · Spirometry  is a test to measure how strong your breathing is  You will breathe out as hard as you can into a plastic tube called a spirometer  · X-ray  pictures may show signs of infection or fluid around your heart or lungs  · Exercise tests  help your healthcare provider learn if you have symptoms that limit activity  Symptoms include leg pain, fatigue, and weakness  Exercise tests can also show if your symptoms are caused by heart problems  · CT scan  pictures may show blood clots or an area of disease in your lungs  You may be given contrast liquid to help your lungs show up better in the pictures  Tell the healthcare provider if you have ever had an allergic reaction to contrast liquid  · An echocardiogram  is a type of ultrasound  Sound waves are used to show the structure and function of your heart  · An EKG  is a test that measures the electrical activity of your heart  How is shortness of breath treated? · Medicines  may be used to treat the cause of your symptoms  You may need medicine to treat a bacterial infection or reduce anxiety  Other medicines may be used to open your airway, reduce swelling, or remove extra fluid  If you have a heart condition, you may need medicine to help your heart beat more strongly or regularly  · Oxygen  may be given to help you breathe more easily  What can I do to manage shortness of breath? · Create an action plan  You and your healthcare provider can work together to create a plan for how to handle shortness of breath  The plan can include daily activities, treatment changes, and what to do if you have severe breathing problems  · Lean forward on your elbows when you sit    This helps your lungs expand and may make it easier to breathe  · Use pursed-lip breathing any time you feel short of breath  Breathe in through your nose and then slowly breathe out through your mouth with your lips slightly puckered  It should take you twice as long to breathe out as it did to breathe in  · Do not smoke  Nicotine and other chemicals in cigarettes and cigars can cause lung damage and make shortness of breath worse  Ask your healthcare provider for information if you currently smoke and need help to quit  E-cigarettes or smokeless tobacco still contain nicotine  Talk to your healthcare provider before you use these products  · Reach or maintain a healthy weight  Your healthcare provider can help you create a safe weight loss plan if you are overweight  · Exercise as directed  Exercise can help your lungs work more easily  Exercise can also help you lose weight if needed  Try to get at least 30 minutes of exercise most days of the week  When should I seek immediate care? · Your signs and symptoms are the same or worse within 24 hours of treatment  · The skin over your ribs or on your neck sinks in when you breathe  · You feel confused or dizzy  When should I contact my healthcare provider? · You have new or worsening symptoms  · You have questions or concerns about your condition or care  CARE AGREEMENT:   You have the right to help plan your care  Learn about your health condition and how it may be treated  Discuss treatment options with your caregivers to decide what care you want to receive  You always have the right to refuse treatment  The above information is an  only  It is not intended as medical advice for individual conditions or treatments  Talk to your doctor, nurse or pharmacist before following any medical regimen to see if it is safe and effective for you    © 2017 [employer] Information is for End User's use only and may not be sold, redistributed or otherwise used for commercial purposes  All illustrations and images included in CareNotes® are the copyrighted property of A D A M , Inc  or Leobardo Hurd

## 2018-04-21 NOTE — ED PROVIDER NOTES
History  Chief Complaint   Patient presents with    Shortness of Breath     Arrived via EMS     Patient arrives via EMS tonight for shortness of breath  She states that she is here visiting from [facility] for    She states about 4-5 hours ago she gradually developed some shortness of breath  She states that it worsened about an hour prior to arrival   She received a nebulizer in the ambulance which made her feel better  Patient has a history of AFib and is on Xarelto  No history of COPD, asthma, CAD that she knows of  History provided by:  Patient and friend   used: No    Shortness of Breath   Severity:  Moderate  Onset quality:  Gradual  Duration:  5 hours  Timing:  Constant  Progression:  Worsening  Chronicity:  New  Context comment:  At rest  Relieved by: Nebulizer  Worsened by:  Stress and activity  Ineffective treatments:  None tried  Associated symptoms: no abdominal pain, no chest pain, no cough, no fever, no headaches, no rash and no vomiting    Risk factors: alcohol use        None       Past Medical History:   Diagnosis Date    Atrial fibrillation ([address] )     Depression        Past Surgical History:   Procedure Laterality Date    REPLACEMENT TOTAL KNEE Right        History reviewed  No pertinent family history  I have reviewed and agree with the history as documented  Social History   Substance Use Topics    Smoking status: Current Every Day Smoker     Packs/day: 0 50    Smokeless tobacco: Never Used    Alcohol use Yes        Review of Systems   Constitutional: Negative for chills and fever  Respiratory: Positive for shortness of breath  Negative for cough and chest tightness  Cardiovascular: Negative for chest pain, palpitations and leg swelling  Gastrointestinal: Negative for abdominal pain, nausea and vomiting  Skin: Negative for color change, pallor, rash and wound  Allergic/Immunologic: Negative for immunocompromised state  Neurological: Negative for dizziness, light-headedness and headaches  All other systems reviewed and are negative  Physical Exam  ED Triage Vitals   Temperature Pulse Respirations Blood Pressure SpO2   04/20/18 2057 04/20/18 2057 04/20/18 2057 04/20/18 2057 04/20/18 2057   97 6 °F (36 4 °C) (!) 112 (!) 28 (!) 180/86 94 %      Temp Source Heart Rate Source Patient Position - Orthostatic VS BP Location FiO2 (%)   04/20/18 2057 04/20/18 2057 04/20/18 2057 04/20/18 2057 --   Oral Monitor Lying Left arm       Pain Score       04/20/18 2238       No Pain           Orthostatic Vital Signs  Vitals:    04/20/18 2057 04/20/18 2238   BP: (!) 180/86 (!) 187/118   Pulse: (!) 112 (!) 118   Patient Position - Orthostatic VS: Lying Lying       Physical Exam   Constitutional: She is oriented to person, place, and time  She appears well-developed and well-nourished  No distress  HENT:   Head: Normocephalic and atraumatic  Mouth/Throat: Oropharynx is clear and moist    Eyes: Conjunctivae and EOM are normal  Pupils are equal, round, and reactive to light  No scleral icterus  Neck: Normal range of motion  Neck supple  Cardiovascular: Normal rate, normal heart sounds and intact distal pulses  An irregularly irregular rhythm present  Exam reveals no friction rub  No murmur heard  Pulmonary/Chest: Effort normal  No stridor  No respiratory distress  She has decreased breath sounds  She has wheezes in the right lower field  She has no rales  Abdominal: Soft  She exhibits no distension  There is no tenderness  There is no rebound and no guarding  Musculoskeletal: Normal range of motion  She exhibits no edema, tenderness or deformity  Neurological: She is alert and oriented to person, place, and time  Skin: Skin is warm and dry  She is not diaphoretic  Psychiatric: She has a normal mood and affect  Nursing note and vitals reviewed        ED Medications  Medications    EMS REPLENISHMENT MED ( Does not apply Given to EMS 4/20/18 2142)   ipratropium-albuterol (DUO-NEB) 0 5-2 5 mg/3 mL inhalation solution 3 mL (3 mL Nebulization Given 4/20/18 2154)   albuterol (PROVENTIL HFA,VENTOLIN HFA) inhaler 2 puff (2 puffs Inhalation Given 4/20/18 2249)   dexamethasone 10 mg/mL oral liquid 10 mg 1 mL (10 mg Oral Given 4/20/18 2248)       Diagnostic Studies  Results Reviewed     Procedure Component Value Units Date/Time    Magnesium [[id number]]  (Normal) Collected:  04/20/18 2143    Lab Status:  Final result Specimen:  Blood from Arm, Right Updated:  04/20/18 2221     Magnesium 1 6 mg/dL     B-type natriuretic peptide [[id number]]  (Abnormal) Collected:  04/20/18 2143    Lab Status:  Final result Specimen:  Blood from Arm, Right Updated:  04/20/18 2221     NT-proBNP 2,624 (H) pg/mL     Comprehensive metabolic panel [[id number]]  (Abnormal) Collected:  04/20/18 2143    Lab Status:  Final result Specimen:  Blood from Arm, Right Updated:  04/20/18 2219     Sodium 135 (L) mmol/L      Potassium 3 8 mmol/L      Chloride 99 (L) mmol/L      CO2 24 mmol/L      Anion Gap 12 mmol/L      BUN 17 mg/dL      Creatinine 0 87 mg/dL      Glucose 105 mg/dL      Calcium 8 7 mg/dL      AST 41 U/L      ALT 25 U/L      Alkaline Phosphatase 139 (H) U/L      Total Protein 7 1 g/dL      Albumin 3 6 g/dL      Total Bilirubin 0 48 mg/dL      eGFR 71 ml/min/1 73sq m     Narrative:         National Kidney Disease Education Program recommendations are as follows:  GFR calculation is accurate only with a steady state creatinine  Chronic Kidney disease less than 60 ml/min/1 73 sq  meters  Kidney failure less than 15 ml/min/1 73 sq  meters      Troponin I [[id number]]  (Normal) Collected:  04/20/18 2143    Lab Status:  Final result Specimen:  Blood from Arm, Right Updated:  04/20/18 2213     Troponin I <0 02 ng/mL     Narrative:         [employer] Chemistry analyzer 99% cutoff is > 0 04 ng/mL in network labs    o cTnI 99% cutoff is useful only when applied to patients in the clinical setting of myocardial ischemia  o cTnI 99% cutoff should be interpreted in the context of clinical history, ECG findings and possibly cardiac imaging to establish correct diagnosis  o cTnI 99% cutoff may be suggestive but clearly not indicative of a coronary event without the clinical setting of myocardial ischemia  Protime-INR [[id number]]  (Normal) Collected:  04/20/18 2143    Lab Status:  Final result Specimen:  Blood from Arm, Right Updated:  04/20/18 2201     Protime 14 1 seconds      INR 1 09    APTT [[id number]]  (Normal) Collected:  04/20/18 2143    Lab Status:  Final result Specimen:  Blood from Arm, Right Updated:  04/20/18 2201     PTT 29 seconds     CBC and differential [[id number]]  (Abnormal) Collected:  04/20/18 2143    Lab Status:  Final result Specimen:  Blood from Arm, Right Updated:  04/20/18 2154     WBC 11 64 (H) Thousand/uL      RBC 3 79 (L) Million/uL      Hemoglobin 13 1 g/dL      Hematocrit 38 7 %       (H) fL      MCH 34 6 (H) pg      MCHC 33 9 g/dL      RDW 15 1 %      MPV 9 7 fL      Platelets 416 Thousands/uL      nRBC 0 /100 WBCs      Neutrophils Relative 86 (H) %      Lymphocytes Relative 8 (L) %      Monocytes Relative 6 %      Eosinophils Relative 0 %      Basophils Relative 0 %      Neutrophils Absolute 9 84 (H) Thousands/µL      Lymphocytes Absolute 0 96 Thousands/µL      Monocytes Absolute 0 75 Thousand/µL      Eosinophils Absolute 0 05 Thousand/µL      Basophils Absolute 0 04 Thousands/µL                  XR chest 2 views   ED Interpretation by Isatu Chase DO (04/20 2244)   Flattened diaphragm  No consolidations                   Procedures  ECG 12 Lead Documentation  Date/Time: 4/20/2018 9:55 PM  Performed by: Elmer Rodrigez  Authorized by: Elmer Rodrigez     Indications / Diagnosis:  SOB  Patient location:  ED  Previous ECG:     Previous ECG:  Unavailable  Interpretation:     Interpretation: abnormal    Rate:     ECG rate:  109    ECG rate assessment: tachycardic    Rhythm:     Rhythm: atrial fibrillation    Ectopy:     Ectopy: none    QRS:     QRS axis:  Normal    QRS intervals:  Normal  Conduction:     Conduction: normal    ST segments:     ST segments:  Non-specific  T waves:     T waves: non-specific             Phone Contacts  ED Phone Contact    ED Course  ED Course                                MDM  Number of Diagnoses or Management Options  COPD exacerbation ([facility] [address] ): new and requires workup  Hypertension: new and requires workup  Shortness of breath: new and requires workup  Diagnosis management comments: 9:38 PM  Family spoke with me outside the room  He states that the patient is a longstanding alcoholic  She had 3 beers tonight  He is concerned that her alcoholism is getting worse  The patient does not want treatment for this though  I asked the patient earlier if she fell and she said no  He said that the patient did fall about 3 times in the past 2 days  No known injuries that he is aware of  Patient is neurologically intact at this time without external signs of injury  He is unsure if she has properly taking her medications because of her alcoholism  The patient told me that she is  We have no prior documentation on this patient  At this point will proceed with a cardiac workup will wheezing  10:45 PM  Pt is feeling better and wants to leave  Workup overall appears negative  Her chest x-ray does show some flattened diaphragms which would be consistent with COPD  Given her longstanding smoking I will provide her with an inhaler and give her a dose of steroids  Her BMP is slightly elevated but I do not see vascular congestion on her x-ray  I stressed to her that she needs to follow up with her primary care physician  I explained that her heart rate might continue to be elevated with this nebulizer on board    She understands this and understands that some of her mother medications such as metoprolol might need to be adjusted  She will return if anything worsens  Questions and concerns answered  Patient does not want to stay for any further treatment or evaluation  She is clinically sober and alert and oriented x3  Amount and/or Complexity of Data Reviewed  Clinical lab tests: ordered and reviewed  Tests in the radiology section of CPT®: ordered and reviewed  Tests in the medicine section of CPT®: reviewed and ordered  Independent visualization of images, tracings, or specimens: yes    Patient Progress  Patient progress: stable    CritCare Time    Disposition  Final diagnoses:   Shortness of breath   COPD exacerbation ([facility] )   Hypertension     Time reflects when diagnosis was documented in both MDM as applicable and the Disposition within this note     Time User Action Codes Description Comment    4/20/2018 10:44 PM Maxwell Rivera Add [R06 02] Shortness of breath     4/20/2018 10:45 PM Lucretia Culp Add [J44 1] COPD exacerbation ([facility] )     4/20/2018 11:21 PM Lucreita Culp Add [I10] Hypertension       ED Disposition     ED Disposition Condition Comment    Discharge  [address] discharge to home/self care  Condition at discharge: Good        Follow-up Information     Follow up With Specialties Details Why Contact Info    Cristal Bose MD Internal Medicine Call in 2 days To schedule an appointment as soon as you can [address]  [phone]          There are no discharge medications for this patient  No discharge procedures on file      ED Provider  Electronically Signed by           Cipriano Guerrero DO  04/20/18 5676

## 2018-04-24 ENCOUNTER — HOSPITAL ENCOUNTER (INPATIENT)
Dept: HOSPITAL 45 - C.EDB | Age: 63
LOS: 10 days | Discharge: HOME HEALTH | DRG: 981 | End: 2018-05-04
Attending: HOSPITALIST | Admitting: INTERNAL MEDICINE
Payer: COMMERCIAL

## 2018-04-24 VITALS
BODY MASS INDEX: 22.22 KG/M2 | WEIGHT: 155.21 LBS | WEIGHT: 155.21 LBS | HEIGHT: 70 IN | HEIGHT: 70 IN | BODY MASS INDEX: 22.22 KG/M2

## 2018-04-24 VITALS
SYSTOLIC BLOOD PRESSURE: 141 MMHG | TEMPERATURE: 97.52 F | HEART RATE: 93 BPM | DIASTOLIC BLOOD PRESSURE: 77 MMHG | OXYGEN SATURATION: 98 %

## 2018-04-24 VITALS
TEMPERATURE: 98.24 F | OXYGEN SATURATION: 94 % | HEART RATE: 92 BPM | DIASTOLIC BLOOD PRESSURE: 69 MMHG | SYSTOLIC BLOOD PRESSURE: 117 MMHG

## 2018-04-24 DIAGNOSIS — E78.5: ICD-10-CM

## 2018-04-24 DIAGNOSIS — H05.20: ICD-10-CM

## 2018-04-24 DIAGNOSIS — Z79.01: ICD-10-CM

## 2018-04-24 DIAGNOSIS — Z51.81: ICD-10-CM

## 2018-04-24 DIAGNOSIS — I34.0: ICD-10-CM

## 2018-04-24 DIAGNOSIS — Z96.651: ICD-10-CM

## 2018-04-24 DIAGNOSIS — Z66: ICD-10-CM

## 2018-04-24 DIAGNOSIS — E83.42: ICD-10-CM

## 2018-04-24 DIAGNOSIS — J20.9: ICD-10-CM

## 2018-04-24 DIAGNOSIS — S32.10XA: ICD-10-CM

## 2018-04-24 DIAGNOSIS — Z86.19: ICD-10-CM

## 2018-04-24 DIAGNOSIS — I11.9: ICD-10-CM

## 2018-04-24 DIAGNOSIS — F10.239: ICD-10-CM

## 2018-04-24 DIAGNOSIS — E86.0: ICD-10-CM

## 2018-04-24 DIAGNOSIS — N17.9: ICD-10-CM

## 2018-04-24 DIAGNOSIS — F31.9: ICD-10-CM

## 2018-04-24 DIAGNOSIS — F17.200: ICD-10-CM

## 2018-04-24 DIAGNOSIS — I48.2: ICD-10-CM

## 2018-04-24 DIAGNOSIS — J44.0: Primary | ICD-10-CM

## 2018-04-24 DIAGNOSIS — Z79.82: ICD-10-CM
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
## 2018-04-24 DIAGNOSIS — Z91.048: ICD-10-CM

## 2018-04-24 DIAGNOSIS — Z79.899: ICD-10-CM

## 2018-04-24 DIAGNOSIS — I95.9: ICD-10-CM

## 2018-04-24 DIAGNOSIS — J45.909: ICD-10-CM

## 2018-04-24 DIAGNOSIS — F41.9: ICD-10-CM

## 2018-04-24 DIAGNOSIS — Z91.81: ICD-10-CM

## 2018-04-24 DIAGNOSIS — I48.92: ICD-10-CM

## 2018-04-24 DIAGNOSIS — Z88.3: ICD-10-CM
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
## 2018-04-24 DIAGNOSIS — Z82.49: ICD-10-CM

## 2018-04-24 DIAGNOSIS — M25.521: ICD-10-CM

## 2018-04-24 DIAGNOSIS — R55: ICD-10-CM

## 2018-04-24 DIAGNOSIS — J96.01: ICD-10-CM

## 2018-04-24 DIAGNOSIS — Z83.3: ICD-10-CM

## 2018-04-24 DIAGNOSIS — I25.2: ICD-10-CM

## 2018-04-24 DIAGNOSIS — Z81.8: ICD-10-CM

## 2018-04-24 LAB
ALBUMIN SERPL-MCNC: 3.7 GM/DL (ref 3.4–5)
ALP SERPL-CCNC: 136 U/L (ref 45–117)
ALT SERPL-CCNC: 23 U/L (ref 12–78)
AST SERPL-CCNC: 28 U/L (ref 15–37)
BASOPHILS # BLD: 0.02 K/UL (ref 0–0.2)
BASOPHILS NFR BLD: 0.3 %
BUN SERPL-MCNC: 19 MG/DL (ref 7–18)
CALCIUM SERPL-MCNC: 9.2 MG/DL (ref 8.5–10.1)
CO2 SERPL-SCNC: 26 MMOL/L (ref 21–32)
CREAT SERPL-MCNC: 1.27 MG/DL (ref 0.6–1.2)
EOS ABS #: 0.02 K/UL (ref 0–0.5)
EOSINOPHIL NFR BLD AUTO: 266 K/UL (ref 130–400)
FLUAV RNA SPEC QL NAA+PROBE: (no result)
FLUBV RNA SPEC QL NAA+PROBE: (no result)
GLUCOSE SERPL-MCNC: 100 MG/DL (ref 70–99)
HCT VFR BLD CALC: 42.4 % (ref 37–47)
HGB BLD-MCNC: 14.7 G/DL (ref 12–16)
IG#: 0.07 K/UL (ref 0–0.02)
IMM GRANULOCYTES NFR BLD AUTO: 11.7 %
INR PPP: 1 (ref 0.9–1.1)
LYMPHOCYTES # BLD: 0.9 K/UL (ref 1.2–3.4)
MCH RBC QN AUTO: 34.4 PG (ref 25–34)
MCHC RBC AUTO-ENTMCNC: 34.7 G/DL (ref 32–36)
MCV RBC AUTO: 99.3 FL (ref 80–100)
MONO ABS #: 1.06 K/UL (ref 0.11–0.59)
MONOCYTES NFR BLD: 13.8 %
NEUT ABS #: 5.59 K/UL (ref 1.4–6.5)
NEUTROPHILS # BLD AUTO: 0.3 %
NEUTROPHILS NFR BLD AUTO: 73 %
PMV BLD AUTO: 8.8 FL (ref 7.4–10.4)
POTASSIUM SERPL-SCNC: 3.3 MMOL/L (ref 3.5–5.1)
PROT SERPL-MCNC: 7.7 GM/DL (ref 6.4–8.2)
PTT PATIENT: 26.9 SECONDS (ref 21–31)
RED CELL DISTRIBUTION WIDTH CV: 15.1 % (ref 11.5–14.5)
RED CELL DISTRIBUTION WIDTH SD: 54.1 FL (ref 36.4–46.3)
SODIUM SERPL-SCNC: 132 MMOL/L (ref 136–145)
WBC # BLD AUTO: 7.66 K/UL (ref 4.8–10.8)

## 2018-04-24 RX ADMIN — SODIUM CHLORIDE, SODIUM LACTATE, POTASSIUM CHLORIDE, AND CALCIUM CHLORIDE SCH MLS/HR: 600; 310; 30; 20 INJECTION, SOLUTION INTRAVENOUS at 22:32

## 2018-04-24 RX ADMIN — ATORVASTATIN CALCIUM SCH MG: 40 TABLET, FILM COATED ORAL at 22:35

## 2018-04-24 RX ADMIN — MORPHINE SULFATE PRN MG: 4 INJECTION, SOLUTION INTRAMUSCULAR; INTRAVENOUS at 19:32

## 2018-04-24 RX ADMIN — GUAIFENESIN SCH MG: 600 TABLET, EXTENDED RELEASE ORAL at 22:35

## 2018-04-24 RX ADMIN — MORPHINE SULFATE PRN MG: 4 INJECTION, SOLUTION INTRAMUSCULAR; INTRAVENOUS at 15:44

## 2018-04-24 NOTE — HISTORY AND PHYSICAL
History & Physical


Date & Time of Service:


Apr 24, 2018 at 19:04


Chief Complaint:


Afib,Sob,Sudden Temp Increase


Primary Care Physician:


Sven Osborne M.D.


History of Present Illness


Source:  patient, clinic records, hospital records


Mrs Dougherty is a 62 year old female with persistent atrial fibrillation/flutter 

who presents from her PCP office due to rapid rate. She denies any palpitations 

but does not worse shortness of breath for the past 4 days with associated 

cough. No fever





Her recent history is significant for falling on 5 days prior while awaiting 

for a bus. She notes no chest pain, shortness of breath or dizziness prior or 

after the fall. She suddenly lost consciousness and woke up on the floor having 

hit her back. Apart from some sacral back pain she felt well otherwise. She 

reports having events like this for the past 5-6 years but never had one 

captured with an event monitor.





4 days prior she had a sudden onset episode of diaphoresis, coughing and 

shortness of breath while sitting on the sofa. It was severe enough she went to 

the ER at [facility]. She remembers treated with nebulizers which 

helped with the shortness of breath and she was discharged with a diagnosis of 

COPD. She continued to be short of breath and her albuterol inhaler hasn't been 

helping much at home. Her appetite has also decreased (no food getting stuck or 

nausea/vomiting). She followed up with her PCP today and was noted to have a 

rapid rate. In the ER she was given IV amiodarone 150mg, Zosyn, NSS 500ml bolus 

and levalbuterol/ipratropium nebulizer.





She has not been on amiodarone since November 2017.





Past Medical/Surgical History


Medical Problems:


Alcoholism


Anxiety State Nos


Asthma


Bipolar Disorder, Unspecified


Depressive Disorder Nec


Emphysema


HTN (hypertension)


Hyperlipidemia Nec/Nos


Non-ST elevated myocardial infarction (non-STEMI)


Syncope and collapse


Traumatic rhabdomyolysis


Surgical Problems:


(1) H/O total knee replacement


(2) H/O: hysterectomy





Family History





Diabetes mellitus


  BROTHER


Heart disease


Hypertension


  FATHER


  MOTHER


Lung disease


  MOTHER





Social History


Smoking Status:  Current Every Day Smoker (15 pack-year)


Alcohol Use:  heavy (alcohol use disorder (trying to cut down))


Drug Use:  none


Marital Status:  


Housing status:  lives with roommate


Occupational Status:  employed





Immunizations


History of Influenza Vaccine:  Yes


History of Tetanus Vaccine?:  No


History of Pneumococcal:  No


History of Hepatitis B Vaccine:  No





Allergies


Coded Allergies:  


     Metronidazole (Unverified  Allergy, Unknown, RASH, 4/24/18)


     Molds and Smuts (Verified  Allergy, Unknown, sneezing, 4/24/18)





Home Medications


Scheduled


Amoxicillin (Amoxil), 1 CAP PO TID


Aspirin (Aspirin Ec), 81 MG PO QAM


Atorvastatin (Lipitor), 40 MG PO HS


Docusate Sodium (Docusate Sodium), 1 CAP PO DAILY


Ferrous Gluconate (Ferrous Gluconate), 324 MG PO TIDM


Hctz/Losartan (Hyzaar 25MG/100MG), 1 TAB PO DAILY


Lamotrigine (Lamotrigine), 100 MG PO HS


Levetiracetam (Keppra), 500 MG PO BID


Meclizine Hcl (Meclizine Hcl), 1 TAB PO TID


Metoprolol Succ (Toprol Xl) (Toprol-Xl), 25 MG PO DAILY


Rivaroxaban (Xarelto), 1 TAB PO DAILY


Venlafaxine HCl (Venlafaxine HCl ER), 75 MG PO QAM





Scheduled PRN


Acetaminophen (Tylenol), 650 MG PO Q6 PRN for Pain


Furosemide (Furosemide), 20 MG PO DAILY PRN for edema





Review of Systems


Constitutional:  + weight loss (last week 5lb weight loss), No fever, No chills


Respiratory:  + cough, + shortness of breath, + dyspnea on exertion, No wheezing

, No dyspnea at rest, No hemoptysis


Cardiovascular:  No chest pain, No orthopnea, No PND, No edema, No claudication

, No palpitations


Abdomen:  + diarrhea (intermittent with constipation chronically, no acute 

change), No pain, No nausea, No vomiting, No constipation, No GI bleeding


Musculoskeletal:  No joint pain, No muscle pain


Genitourinary - Female:  No dysuria, No urinary frequency, No urinary urgency, 

No urinary incontinence


Neurologic:  No memory loss, No paralysis, No weakness, No numbness/tingling, 

No vertigo, No balance problems


Psychiatric:  No depression symptoms


Hematologic / Lymphatic:  No abnormal bleeding/bruising


Integumentary:  No rash, No itch





Physical Exam


Vital Signs











  Date Time  Temp Pulse Resp B/P (MAP) Pulse Ox O2 Delivery O2 Flow Rate FiO2


 


4/24/18 15:36  102 18 147/90 98 Room Air  


 


4/24/18 14:46    145/109 92   


 


4/24/18 14:37  104 21  91   


 


4/24/18 14:31    121/92  Nasal Cannula 2.0 


 


4/24/18 14:16    146/90    


 


4/24/18 14:07  96 14     


 


4/24/18 14:02    117/85    


 


4/24/18 14:01    136/112    


 


4/24/18 13:40  118 29     


 


4/24/18 13:36  108      


 


4/24/18 13:10     94 Room Air  


 


4/24/18 13:10  107 23  93   


 


4/24/18 13:08      Room Air  


 


4/24/18 13:04    119/87    


 


4/24/18 12:49 36.4 95 20 85/69 93 Room Air  








General Appearance:  no apparent distress, + obese


Head:  normocephalic, atraumatic


Eyes:  normal inspection, PERRL, EOMI


ENT:  pharynx normal


Neck:  no JVD, trachea midline


Respiratory/Chest:  normal breath sounds


Cardiovascular:  + tachycardia, + systolic murmur (5/6 holosystolic murmur in 

apex), + irregularly irregular


Abdomen/GI:  normal bowel sounds, non tender, soft


Back:  no CVA tenderness


Extremities/Musculoskelatal:  no calf tenderness, normal capillary refill, no 

pedal edema, + pertinent finding (right TKA noted)


Neurologic/Psych:  CNs II-XII nml as tested, no motor/sensory deficits (grossly 

normal upper and lower limbs), alert, oriented x 3


Skin:  normal color, warm/dry, no rash





Diagnostics


Laboratory Results





Results Past 24 Hours








Test


  4/24/18


13:23 4/24/18


13:40 Range/Units


 


 


Influenza Type A (RT-PCR) Neg for Influ A  NEG  


 


Influenza Type B (RT-PCR) Neg for Influ B  NEG  


 


White Blood Count  7.66 4.8-10.8  K/uL


 


Red Blood Count  4.27 4.2-5.4  M/uL


 


Hemoglobin  14.7 12.0-16.0  g/dL


 


Hematocrit  42.4 37-47  %


 


Mean Corpuscular Volume  99.3   fL


 


Mean Corpuscular Hemoglobin  34.4 25-34  pg


 


Mean Corpuscular Hemoglobin


Concent 


  34.7


  32-36  g/dl


 


 


Platelet Count  266 130-400  K/uL


 


Mean Platelet Volume  8.8 7.4-10.4  fL


 


Neutrophils (%) (Auto)  73.0  %


 


Lymphocytes (%) (Auto)  11.7  %


 


Monocytes (%) (Auto)  13.8  %


 


Eosinophils (%) (Auto)  0.3  %


 


Basophils (%) (Auto)  0.3  %


 


Neutrophils # (Auto)  5.59 1.4-6.5  K/uL


 


Lymphocytes # (Auto)  0.90 1.2-3.4  K/uL


 


Monocytes # (Auto)  1.06 0.11-0.59  K/uL


 


Eosinophils # (Auto)  0.02 0-0.5  K/uL


 


Basophils # (Auto)  0.02 0-0.2  K/uL


 


RDW Standard Deviation  54.1 36.4-46.3  fL


 


RDW Coefficient of Variation  15.1 11.5-14.5  %


 


Immature Granulocyte % (Auto)  0.9  %


 


Immature Granulocyte # (Auto)  0.07 0.00-0.02  K/uL


 


Prothrombin Time


  


  10.7


  9.0-12.0


SECONDS


 


Prothromb Time International


Ratio 


  1.0


  0.9-1.1  


 


 


Activated Partial


Thromboplast Time 


  26.9


  21.0-31.0


SECONDS


 


Partial Thromboplastin Ratio  1.0  


 


Sodium Level  132 136-145  mmol/L


 


Potassium Level  3.3 3.5-5.1  mmol/L


 


Chloride Level  97   mmol/L


 


Carbon Dioxide Level  26 21-32  mmol/L


 


Anion Gap  9.0 3-11  mmol/L


 


Blood Urea Nitrogen  19 7-18  mg/dl


 


Creatinine


  


  1.27


  0.60-1.20


mg/dl


 


Estimated GFR (


American) 


  52.0


   


 


 


Estimated GFR (Non-


American 


  44.9


   


 


 


BUN/Creatinine Ratio  15.1 10-20  


 


Random Glucose  100 70-99  mg/dl


 


Lactic Acid Level  2.0 0.4-2.0  mmol/L


 


Calcium Level  9.2 8.5-10.1  mg/dl


 


Magnesium Level  1.6 1.8-2.4  mg/dl


 


Total Bilirubin  0.7 0.2-1  mg/dl


 


Aspartate Amino Transf


(AST/SGOT) 


  28


  15-37  U/L


 


 


Alanine Aminotransferase


(ALT/SGPT) 


  23


  12-78  U/L


 


 


Alkaline Phosphatase  136   U/L


 


Troponin I  < 0.015 0-0.045  ng/ml


 


Total Protein  7.7 6.4-8.2  gm/dl


 


Albumin  3.7 3.4-5.0  gm/dl


 


Globulin  4.0 2.5-4.0  gm/dl


 


Albumin/Globulin Ratio  0.9 0.9-2  


 


Thyroid Stimulating Hormone


(TSH) 


  1.480


  0.300-4.500


uIu/ml








Microbiology Results


4/24/18 Blood Culture, Received


          Pending


4/24/18 Blood Culture, Received


          Pending





Diagnostic Radiology


CHEST ONE VIEW PORTABLE





CLINICAL HISTORY: EVALUATE ALTERED MENTAL STATUS/WEAKNESS    





COMPARISON STUDY:  Chest radiograph November 25, 2015.





FINDINGS: No pneumothorax or pleural effusion is noted. Patient is rotated.


Several old right rib fractures are noted. There is no evidence for pulmonary


edema or pneumonia. Cardiomediastinal silhouette is unremarkable. 





IMPRESSION:  No acute cardiopulmonary findings. 





Electronically signed by:  Anmol Rust M.D.


4/24/2018 1:50 PM





Dictated Date/Time:  4/24/2018 1:49 PM





EKG


Atrial flutter with variable A-V block


ST & T wave abnormality, consider lateral ischemia


Abnormal ECG


When compared with ECG of 20-NOV-2015 14:34,


Atrial flutter has replaced Sinus rhythm


Vent. rate has increased BY 60 BPM


ST now depressed in Lateral leads


T wave inversion more evident in Anterolateral leads


Confirmed by MITCHELL GUERRIER ([id number]) on 4/24/2018 1:50:27 PM





Impression


Assessment and Plan


62 year old female here for atrial flutter with rapid rate and hypotension





Acute Bronchitis with COPD (mild obstructive disease on most recent PFTs, FEV1/

FVC 65) - no pneumonia on CXR


- azithromycin


- Levalbuterol/ipratropium nebulizers, hold off steroids currently as no 

wheezing or hypoxia





Dehydration / elevated Cr


-  MLS/HR


- no further infection found on history to suggest need for other antibiotics (

awaiting CT head to also assess sinuses)





Atrial flutter/fibrillation (chronic, not new), Stable moderate mitral 

regurgitation (echo April 2017) (Hx endocarditis) - suspect elevated rate due 

to dehydration from low appetite


-  MLS/HR, monitor for heart failure given mitral regurgitation


- rate control overnight with IV metoprolol (call physician if she needs it), 

continue oral metoprolol in morning


- no further amiodarone as this was d/c'd in Nov and plan is for rate control


- anticoagulation with xarelto


- given ischemic changes noted on EKG (most likely rate related), will get 

serial troponins


- consult cardiology (under Dr Leiva) - will defer repeat echocardiogram to 

them





Cardiac sounding syncope - non acute


- consult cardiology ?need for loop recorder





Hx fall


- CT head stat (no CT done at [facility] and patient is on anticoagulation)





Sacral fracture <1cm displacement, no neurological deficit on exam


- pain relief with acetaminophen, avoid NSAIDs due to xarelto use





Anxiety, Bipolar, depression


- continue venlafaxine and lamictal





HTN


- continue metoprolol


- hold losartan due to elevated Cr and hypotension on arrival (no longer takes 

HCZT as per outpatient notes)





VTE Prophylaxis


- continue xarelto





Code


- DNR as per patient wishes





Disposition


- admit to telemetry for cardiac monitoring





===================================================================





I personally interviewed and examined the patient.


I agree with history of present illness and physical exam mentioned above, I 

also performed my own history taking and examination.


Past medical history and review of system has been obtained by myself


I reviewed all pertinent labs and studies


Reviewed current medications


I discussed and formulated of the assessment and plan mentioned above.


Please refer to the Summary mentioned below.





62-year-old female with chronic atrial fibrillation/flutter patient was 

diagnosed recently with COPD at [facility], since then patient has not 

been feeling well status post fall and sacral fracture, orthopedic was 

contacted by ED physician and no intervention required.


Patient continued to have shortness of breath presented to the ED and found to 

have atrial flutter with RVR and hypotension.  Patient received amiodarone IV, 

blood pressure improved as soon as the heart rate went down.  Patient will be 

admitted for further evaluation, complaining of productive cough and shortness 

of breath, patient will be started on azithromycin and bronchodilators.


Cardiologist consulted.


Currently rate is controlled and blood pressure improved.  Status post IV fluid 

bolus in the ED.





General Appearance: not in acute distress


Eyes: normal Sclerae,  extraocular muscle intact


ENT:  hearing grossly normal


Neck:  supple


Respiratory/Chest: normal air entry  bilateral ,no respiratory distress, no 

accessory muscle use, minimal wheezing


Cardiovascular:  r irregular irregularity with tachycardia, no  murmur


Abdomen:   non tender, soft, no masses


Extremities:  no edema


musculoskeletal: no significant swelling or inflammation in any joint


Neurologic/Psychiatric: Awake alert oriented times place and person moves all 

extremities sensation intact cranial nerves II-12 appear to be intact


Skin:  normal color, warm/dry, no rash





George Carvalho MD, 


[facility]





Resuscitation Status


DNR as per patient wishes





VTE Prophylaxis


Will order VTE Prophylaxis:  Yes (xarelto)





Additional Copies To


Sven Osborne M.D.





Resident Tracking


Resident Involvement:  Resident Care Provided


Care Provided:  Adult Hospital Medicine

## 2018-04-24 NOTE — DIAGNOSTIC IMAGING REPORT
CHEST ONE VIEW PORTABLE



CLINICAL HISTORY: EVALUATE ALTERED MENTAL STATUS/WEAKNESS    



COMPARISON STUDY:  Chest radiograph November 25, 2015.



FINDINGS: No pneumothorax or pleural effusion is noted. Patient is rotated.

Several old right rib fractures are noted. There is no evidence for pulmonary

edema or pneumonia. Cardiomediastinal silhouette is unremarkable. 



IMPRESSION:  No acute cardiopulmonary findings. 









Electronically signed by:  Anmol Rust M.D.

4/24/2018 1:50 PM



Dictated Date/Time:  4/24/2018 1:49 PM

## 2018-04-24 NOTE — EMERGENCY ROOM VISIT NOTE
History


Report prepared by Gabriela:  Don Lin


Under the Supervision of:  Dr. Ruel Benavidez M.D.


First contact with patient:  13:05


Chief Complaint:  IRREGULAR HEARTBEAT


Stated Complaint:  AFIB,SOB,SUDDEN TEMP INCREASE





History of Present Illness


The patient is a 62 year old female who presents to the Emergency Room with 

complaints of a persistent irregular heartbeat starting this morning. The 

patient states that five days ago she was taking a bus and at the bus station 

she got dizzy, and then she lost consciousness and fell, which has happened in 

the past. She did not get hurt, though her buttocks was sore the next day. She 

reports that when she hit the ground she came to and was not confused or 

disoriented. Four nights ago she began coughing and got very warm and was 

sweating, and then afterwards she became clammy and short of breath. She went 

to a hospital that night, and she was discharged with Ventolin and referred to 

her PCP. She saw her PCP this morning around 1030, and she was still short of 

breath, and she was found to have a very elevated heart rate, and she was in A-

fib. Additionally, her temperature went from 97 to 99.7 within 15 minutes, and 

her blood pressure was low. She notes that she is usually on amiodarone for her 

heart rate. The patient states that she is currently short of breath, and it is 

better with sitting still, and it is worth with exertion, and she coughs with 

exertion. The patient denies any urinary symptoms, abdominal pain, vomiting, or 

diarrhea. The patient states that she is currently on Xarelto, and she notes 

that she has taken all of her medications today. She is not currently on oxygen 

at home, and she used her Ventolin this morning.





   Source of History:  patient


   Onset:  this morning


   Position:  other (heart)


   Quality:  other (irregular heart rate)


   Timing:  other (persistent)


   Associated Symptoms:  + fevers, + SOB, No vomiting, No abdominal pain, No 

diarrhea, No urinary symptoms


Note:


Associated symptoms: Low blood pressure, episodes of being clammy





Review of Systems


See HPI for pertinent positives & negatives. A total of 10 systems reviewed and 

were otherwise negative.





Past Medical & Surgical


Medical Problems:


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 

with presence of antibiotic-impregnated cement spacer


(2) Acute bronchitis


(3) Acute bronchitis


(4) Altered mental status


(5) Anxiety State Nos


(6) Asthma


(7) Atrial flutter


(8) Bipolar Disorder, Unspecified


(9) Bronchitis


(10) Chest pain


(11) Chest pain


(12) Depressive Disorder Nec


(13) Elevated lactic acid level


(14) Emphysema


(15) HTN (hypertension)


(16) Hyperlipidemia Nec/Nos


(17) Hypoxia


(18) Non-ST elevated myocardial infarction (non-STEMI)


(19) Pneumonia


(20) Respiratory failure, acute and chronic


(21) Syncope and collapse


(22) Traumatic rhabdomyolysis


Surgical Problems:


(1) H/O total knee replacement


(2) H/O: hysterectomy








Family History





Diabetes mellitus


  BROTHER


Heart disease


Hypertension


  FATHER


  MOTHER


Lung disease


  MOTHER





Social History


Smoking Status:  Current Every Day Smoker


Alcohol Use:  other


Drug Use:  none


Marital Status:  


Housing Status:  lives alone


Occupation Status:  employed





Current/Historical Medications


Scheduled


Amoxicillin (Amoxil), 1 CAP PO TID


Aspirin (Aspirin Ec), 81 MG PO QAM


Atorvastatin (Lipitor), 40 MG PO HS


Docusate Sodium (Docusate Sodium), 1 CAP PO DAILY


Ferrous Gluconate (Ferrous Gluconate), 324 MG PO TIDM


Hctz/Losartan (Hyzaar 25MG/100MG), 1 TAB PO DAILY


Lamotrigine (Lamotrigine), 100 MG PO HS


Levetiracetam (Keppra), 500 MG PO BID


Meclizine Hcl (Meclizine Hcl), 1 TAB PO TID


Metoprolol Succ (Toprol Xl) (Toprol-Xl), 25 MG PO DAILY


Rivaroxaban (Xarelto), 1 TAB PO DAILY


Venlafaxine HCl (Venlafaxine HCl ER), 75 MG PO QAM





Scheduled PRN


Acetaminophen (Tylenol), 650 MG PO Q6 PRN for Pain


Furosemide (Furosemide), 20 MG PO DAILY PRN for edema





Allergies


Coded Allergies:  


     Metronidazole (Unverified  Allergy, Unknown, RASH, 4/24/18)


     Molds and Smuts (Verified  Allergy, Unknown, sneezing, 4/24/18)





Physical Exam


Vital Signs











  Date Time  Temp Pulse Resp B/P (MAP) Pulse Ox O2 Delivery O2 Flow Rate FiO2


 


4/24/18 15:36  102 18 147/90 98 Room Air  


 


4/24/18 14:46    145/109 92   


 


4/24/18 14:37  104 21  91   


 


4/24/18 14:31    121/92  Nasal Cannula 2.0 


 


4/24/18 14:16    146/90    


 


4/24/18 14:07  96 14     


 


4/24/18 14:02    117/85    


 


4/24/18 14:01    136/112    


 


4/24/18 13:40  118 29     


 


4/24/18 13:36  108      


 


4/24/18 13:10     94 Room Air  


 


4/24/18 13:10  107 23  93   


 


4/24/18 13:08      Room Air  


 


4/24/18 13:04    119/87    


 


4/24/18 12:49 36.4 95 20 85/69 93 Room Air  











Physical Exam


GENERAL: Patient is in no acute distress.


HEENT: No acute trauma, normocephalic atraumatic, mucous membranes moist, no 

nasal congestion, no scleral icterus.


NECK: No stridor, no adenopathy, no meningismus, trachea is midline.


LUNGS: Scattered wheezing and crackles at the bases. Breath sounds are equal. 

No respiratory distress.


HEART: Tachycardic with and irregular rhythm. No murmurs.


ABDOMEN: Soft, nontender, bowel sounds positive, no hernias, no peritonitis.


EXTREMITIES: No cyanosis or edema, full range of motion of all the joints 

without pain or difficulty, no signs for acute trauma.


NEUROLOGIC: Oriented x 3, no acute motor or sensory deficits, no focal weakness.


SKIN: No rash, no jaundice, no diaphoresis.





Medical Decision & Procedures


ER Provider


Diagnostic Interpretation:


Radiology results as stated below per my review and radiologist interpretation:

















CHEST ONE VIEW PORTABLE





CLINICAL HISTORY: EVALUATE ALTERED MENTAL STATUS/WEAKNESS    





COMPARISON STUDY:  Chest radiograph November 25, 2015.





FINDINGS: No pneumothorax or pleural effusion is noted. Patient is rotated.


Several old right rib fractures are noted. There is no evidence for pulmonary


edema or pneumonia. Cardiomediastinal silhouette is unremarkable. 





IMPRESSION:  No acute cardiopulmonary findings. 





Electronically signed by:  Anmol Rust M.D.


4/24/2018 1:50 PM





Dictated Date/Time:  4/24/2018 1:49 PM




















SACRUM   COCCYX MIN 2 VIEWS





CLINICAL HISTORY: Fall. Sacral pain.   





COMPARISON STUDY:  Bilateral sacroiliac joints 11/24/2008.





FINDINGS: No acute fractures within the sacrum or coccyx. Alignment appears


intact. Presacral soft tissues are within normal limits. There is 7 mm of


anterolisthesis of L5 on S1. Advanced degenerative changes seen within the lower


lumbar spine facets. There is also disc space narrowing at L5-S1. Bilateral


sacroiliac joints are intact.





IMPRESSION:  No fractures identified within the sacrum or coccyx. 





Electronically signed by:  Austyn Weston M.D.


4/24/2018 4:34 PM





Dictated Date/Time:  4/24/2018 4:31 PM




















PELVIS CT





CT DOSE: 429.78 mGy.cm





HISTORY: Pelvic pain.  poss sacral fracture, fall, on Xarelto





TECHNIQUE: Multiaxial CT images of the pelvis were performed and reformatted in


the sagittal and coronal plane without the use of contrast.  A dose lowering


technique was utilized adhering to the principles of ALARA.





COMPARISON:  Sacrum 4/24/2018.





FINDINGS: No fracture or dislocation within the right or left hip. There is


moderate osteoarthritis within the bilateral hips. Transverse fracture through


the S4 vertebral body. This demonstrates up to 3 mm of anterior displacement and


results in severe narrowing of the sacral canal at this level. No additional


fractures identified within the pelvis. Minimal surrounding fat stranding at the


fracture due to the recent trauma. The bladder is unremarkable. The uterus is


surgically absent. Colonic diverticulosis. Normal appendix. No pelvic fluid.





IMPRESSION:  


Transverse fracture through the S4 vertebral body. This demonstrates up to 3 mm


of anterior displacement and results in severe narrowing of the sacral canal at


this level. 





Electronically signed by:  Austyn Weston M.D.


4/24/2018 5:33 PM





Dictated Date/Time:  4/24/2018 5:24 PM





Laboratory Results











Test


  4/24/18


13:23 4/24/18


13:40


 


Influenza Type A (RT-PCR)


  Neg for Influ


A (NEG) 


 


 


Influenza Type B (RT-PCR)


  Neg for Influ


B (NEG) 


 


 


Prothrombin Time


  


  10.7 SECONDS


(9.0-12.0)


 


Prothromb Time International


Ratio 


  1.0 (0.9-1.1) 


 


 


Activated Partial


Thromboplast Time 


  26.9 SECONDS


(21.0-31.0)


 


Partial Thromboplastin Ratio  1.0 


 


Lactic Acid Level


  


  2.0 mmol/L


(0.4-2.0)


 


Thyroid Stimulating Hormone


(TSH) 


  1.480 uIu/ml


(0.300-4.500)








Laboratory results reviewed by me.





Medications Administered











 Medications


  (Trade)  Dose


 Ordered  Sig/Kate


 Route  Start Time


 Stop Time Status Last Admin


Dose Admin


 


 Sodium Chloride  500 ml @ 


 999 mls/hr  Q31M STAT


 IV  4/24/18 13:09


 4/24/18 13:39 DC 4/24/18 13:53


999 MLS/HR


 


 Levalbuterol


  (Xopenex 1.25MG/


 0.5ML Neb)  1.25 mg  NOW  STAT


 INH  4/24/18 13:09


 4/24/18 13:16 DC 4/24/18 13:53


1.25 MG


 


 Ipratropium


 Bromide


  (Atrovent 0.02%


 0.5MG/2.5ML Neb)  0.5 mg  NOW  STAT


 INH  4/24/18 13:09


 4/24/18 13:16 DC 4/24/18 13:53


0.5 MG


 


 Piperacillin Sod/


 Tazobactam Sod


  (Zosyn Iv)  4.5 gm  NOW  STAT


 IV  4/24/18 13:09


 4/24/18 13:16 DC 4/24/18 13:53


4.5 GM


 


 Amiodarone HCL/


 Dextrose


  (Nexterone / D5w)  150 mg  NOW  STAT


 IV  4/24/18 13:19


 4/24/18 13:21 DC 4/24/18 13:59


150 MG


 


 Acetaminophen


  (Tylenol Tab)  1,000 mg  NOW  STAT


 PO  4/24/18 15:03


 4/24/18 15:04 DC 4/24/18 15:29


1,000 MG


 


 Magnesium Sulfate


  (Magnesium


 Sulfate 1gm / D5W)  2 gm  NOW  STAT


 IV  4/24/18 15:06


 4/24/18 15:07 DC 4/24/18 15:44


2 GM


 


 Morphine Sulfate


  (MoRPHine


 SULFATE INJ)  4 mg  Q15M  PRN


 IV  4/24/18 15:45


 4/24/18 20:48 DC 4/24/18 19:32


4 MG


 


 Sodium Chloride  500 ml @ 


 999 mls/hr  Q31M STAT


 IV  4/24/18 17:34


 4/24/18 18:04 DC 4/24/18 18:44


999 MLS/HR











ECG Per My Interpretation


Indication:  other (irregular heartbeat )


Rate (beats per minute):  111


Rhythm:  atrial flutter


Findings:  T-wave inversion (Lateral), other (No PVC. No ST elevation)


Comparison ECG Date:  11/20/15


Change:


Significant changes have occurred





ED Course


1305: The patient was evaluated in room A11. A complete history and physical 

exam was performed.





1309: Zosyn 4.5gm IV, Atrovent 0.02% 0.5mg/2.5ml 0.5mg INH, Levalbuterol 1.25mg 

INH, Sodium Chloride 500 ml @ 999 mls/hr IV





1318: I discussed the patient's case with Dr. Vaz - Cardiology, and he 

recommended giving 150mg of amiodarone IV





1319: Amiodarone HCl/ Dextrose 150mg IV





1503: Tylenol Tab 1000mg PO





1545: Morphine Sulfate 4mg IV





1555: I reevaluated the patient, and she was doing okay. She wanted something 

for pain for her coccyx since she fell. I updated her on the results and the 

treatment plan.





1642: Upon reexamination the patient is doing well. I discussed results and 

treatment plan with the patient. She verbalizes agreement and understanding. 

The patient will be evaluated for further management.





1647: Discussed the patient's case with Dr. Alexis Carvalho - [facility] [place] 

Hospitalist. The patient will be evaluated for further management. 





1734: Sodium Chloride 500 ml @ 999 mls/hr IV





Medical Decision


Differential Diagnoses include: sepsis, pneumonia, influenza, dehydration, 

electrolyte imbalance, MI, UTI, and viral illness.





There is no leukocytosis or concerning anemia.  Renal panel testing shows a low 

magnesium at 1.6, no kidney failure.  No hepatitis.  No coagulopathy.  EKG 

shows a rapid atrial flutter with some inverted T waves laterally.  No evidence 

for acute MI.  Cardiac enzyme testing 1 is not consistent with acute cardiac 

injury.  Chest x-ray does not show pneumonia or pneumothorax.  Influenza 

testing was negative.  Blood cultures are pending.  Lactic acid level was not 

elevated making severe sepsis less likely.





The patient presents with shortness of breath, weakness and a rapid heart rate.

  She was hypotensive upon arrival.





I talked with the patient.  She is on Xarelto now in place of Coumadin.  She 

stated that she was still taking her antiarrhythmic amiodarone.  I did speak 

with cardiology over the phone.  They recommended a dose of amiodarone IV to 

help better control her rhythm.  150 mg of IV amiodarone was given.  She 

received IV saline.  She did receive IV Zosyn as empiric coverage.  She 

received IV magnesium and was given a Xopenex Atrovent neb.  She received oral 

Tylenol for pain and then IV morphine for additional pain control.





The patient is doing better since treatment.  Given her issues, I do think 

hospitalization is warranted.  A film of her sacral area was done, radiology 

saw no fractures.  My suspicion was high so a pelvis CT was done, a sacral 

fracture was seen.  I discussed this with orthopedics, no orthopedic 

intervention required.





The patient is going to be hospitalized.  I spoke with case management, the on-

call hospitalist was consulted.





Medication Reconcilliation


Current Medication List:  was personally reviewed by me





Blood Pressure Screening


Patient's blood pressure:  Elevated blood pressure


Monitored by the hospitalist.





Consults


Time Called:  1316


Consulting Physician:  Dr. Milind Giang


Returned Call:  1319


I discussed the patient's case with Dr. Milind Giang, and he 

recommended giving 150mg of amiodarone IV


Additional Consults:  


   Time Called:  1550


   Consulted Physician:  Dr. Alexis CARRASCO Hospitalist


   Returned Call:  [phone]


Additional Comments:


Discussed the patient's case with Dr. Alexis CARRASCO Hospitalist. The 

patient will be evaluated for further management.





Impression





 Primary Impression:  


 Atrial flutter with rapid ventricular response


 Additional Impressions:  


 Hypomagnesemia


 Hypertension


 Sacral fracture





Critical Care


I have personally spent greater than 30 minutes of critical care time in the 

direct management of this patient.  This includes bedside care, interpretation 

of diagnostic studies and testing, discussion with consultants, the patient, 

and family members, and other required patient management activities.  This 30 

minutes is in excess of all separately billable procedures.





Scribe Attestation


The scribe's documentation has been prepared under my direction and personally 

reviewed by me in its entirety. I confirm that the note above accurately 

reflects all work, treatment, procedures, and medical decision making performed 

by me.





Departure Information


Dispostion


Being Evaluated By Hospitalist





Sven Whaley M.D. (PCP)





Patient Instructions


[facility]





Problem Qualifiers

## 2018-04-24 NOTE — DIAGNOSTIC IMAGING REPORT
PELVIS CT



CT DOSE: 429.78 mGy.cm



HISTORY: Pelvic pain.  poss sacral fracture, fall, on Xarelto



TECHNIQUE: Multiaxial CT images of the pelvis were performed and reformatted in

the sagittal and coronal plane without the use of contrast.  A dose lowering

technique was utilized adhering to the principles of ALARA.



COMPARISON:  Sacrum 4/24/2018.



FINDINGS: No fracture or dislocation within the right or left hip. There is

moderate osteoarthritis within the bilateral hips. Transverse fracture through

the S4 vertebral body. This demonstrates up to 3 mm of anterior displacement and

results in severe narrowing of the sacral canal at this level. No additional

fractures identified within the pelvis. Minimal surrounding fat stranding at the

fracture due to the recent trauma. The bladder is unremarkable. The uterus is

surgically absent. Colonic diverticulosis. Normal appendix. No pelvic fluid.



IMPRESSION:  

Transverse fracture through the S4 vertebral body. This demonstrates up to 3 mm

of anterior displacement and results in severe narrowing of the sacral canal at

this level. 







Electronically signed by:  Austyn Weston M.D.

4/24/2018 5:33 PM



Dictated Date/Time:  4/24/2018 5:24 PM

## 2018-04-24 NOTE — DIAGNOSTIC IMAGING REPORT
SACRUM   COCCYX MIN 2 VIEWS



CLINICAL HISTORY: Fall. Sacral pain.   



COMPARISON STUDY:  Bilateral sacroiliac joints 11/24/2008.



FINDINGS: No acute fractures within the sacrum or coccyx. Alignment appears

intact. Presacral soft tissues are within normal limits. There is 7 mm of

anterolisthesis of L5 on S1. Advanced degenerative changes seen within the lower

lumbar spine facets. There is also disc space narrowing at L5-S1. Bilateral

sacroiliac joints are intact.



IMPRESSION:  No fractures identified within the sacrum or coccyx. 









Electronically signed by:  Austyn Weston M.D.

4/24/2018 4:34 PM



Dictated Date/Time:  4/24/2018 4:31 PM

## 2018-04-24 NOTE — DIAGNOSTIC IMAGING REPORT
CT HEAD WITHOUT CONTRAST (CT)



CLINICAL HISTORY: Head trauma. Patient on anticoagulation.    



COMPARISON STUDY:  7/18/2015



TECHNIQUE:  Axial CT of the brain is performed from the vertex to the skull

base. IV contrast was not administered for this examination. A dose lowering

technique was utilized adhering to the principles of ALARA.

 



CT DOSE: 537.48 mGy.cm



FINDINGS:



No intra or extra-axial mass lesions are visualized. There is no CT evidence of

acute cortical infarction. There is no evidence of midline shift. There is no

acute  hemorrhage. No calvarial fractures are visualized. 

There are minor white matter hypodensities likely on a small vessel basis. There

is an old right cerebellar infarct

There is no evidence of pathologic ventricular dilatation.

There is no evidence of acute sinusitis



IMPRESSION: No acute intracranial findings







Electronically signed by:  Francisco George M.D.

4/24/2018 8:44 PM



Dictated Date/Time:  4/24/2018 8:43 PM

## 2018-04-25 VITALS — HEART RATE: 91 BPM | OXYGEN SATURATION: 92 %

## 2018-04-25 VITALS
HEART RATE: 114 BPM | SYSTOLIC BLOOD PRESSURE: 160 MMHG | OXYGEN SATURATION: 95 % | TEMPERATURE: 98.42 F | DIASTOLIC BLOOD PRESSURE: 111 MMHG

## 2018-04-25 VITALS
TEMPERATURE: 98.24 F | OXYGEN SATURATION: 91 % | HEART RATE: 91 BPM | SYSTOLIC BLOOD PRESSURE: 138 MMHG | DIASTOLIC BLOOD PRESSURE: 83 MMHG

## 2018-04-25 VITALS
DIASTOLIC BLOOD PRESSURE: 83 MMHG | OXYGEN SATURATION: 98 % | HEART RATE: 94 BPM | SYSTOLIC BLOOD PRESSURE: 156 MMHG | TEMPERATURE: 98.06 F

## 2018-04-25 VITALS — HEART RATE: 94 BPM | OXYGEN SATURATION: 94 %

## 2018-04-25 VITALS
OXYGEN SATURATION: 90 % | DIASTOLIC BLOOD PRESSURE: 97 MMHG | TEMPERATURE: 97.52 F | HEART RATE: 104 BPM | SYSTOLIC BLOOD PRESSURE: 152 MMHG

## 2018-04-25 VITALS
SYSTOLIC BLOOD PRESSURE: 188 MMHG | OXYGEN SATURATION: 99 % | DIASTOLIC BLOOD PRESSURE: 111 MMHG | TEMPERATURE: 98.42 F | HEART RATE: 122 BPM

## 2018-04-25 VITALS
DIASTOLIC BLOOD PRESSURE: 84 MMHG | OXYGEN SATURATION: 94 % | HEART RATE: 99 BPM | SYSTOLIC BLOOD PRESSURE: 120 MMHG | TEMPERATURE: 98.06 F

## 2018-04-25 VITALS — HEART RATE: 98 BPM | OXYGEN SATURATION: 84 % | SYSTOLIC BLOOD PRESSURE: 154 MMHG | DIASTOLIC BLOOD PRESSURE: 129 MMHG

## 2018-04-25 VITALS — OXYGEN SATURATION: 86 % | HEART RATE: 88 BPM

## 2018-04-25 VITALS — OXYGEN SATURATION: 84 %

## 2018-04-25 VITALS — OXYGEN SATURATION: 93 % | HEART RATE: 95 BPM

## 2018-04-25 VITALS — HEART RATE: 97 BPM | OXYGEN SATURATION: 88 %

## 2018-04-25 LAB
ALBUMIN SERPL-MCNC: 3 GM/DL (ref 3.4–5)
ALP SERPL-CCNC: 112 U/L (ref 45–117)
ALT SERPL-CCNC: 18 U/L (ref 12–78)
AST SERPL-CCNC: 25 U/L (ref 15–37)
BASOPHILS # BLD: 0.02 K/UL (ref 0–0.2)
BASOPHILS NFR BLD: 0.3 %
BUN SERPL-MCNC: 19 MG/DL (ref 7–18)
CALCIUM SERPL-MCNC: 8.7 MG/DL (ref 8.5–10.1)
CO2 SERPL-SCNC: 26 MMOL/L (ref 21–32)
CREAT SERPL-MCNC: 1.32 MG/DL (ref 0.6–1.2)
EOS ABS #: 0.04 K/UL (ref 0–0.5)
EOSINOPHIL NFR BLD AUTO: 191 K/UL (ref 130–400)
GLUCOSE SERPL-MCNC: 100 MG/DL (ref 70–99)
HCT VFR BLD CALC: 37.2 % (ref 37–47)
HGB BLD-MCNC: 12.4 G/DL (ref 12–16)
IG#: 0.03 K/UL (ref 0–0.02)
IMM GRANULOCYTES NFR BLD AUTO: 10 %
LYMPHOCYTES # BLD: 0.73 K/UL (ref 1.2–3.4)
MCH RBC QN AUTO: 33.5 PG (ref 25–34)
MCHC RBC AUTO-ENTMCNC: 33.3 G/DL (ref 32–36)
MCV RBC AUTO: 100.5 FL (ref 80–100)
MONO ABS #: 0.63 K/UL (ref 0.11–0.59)
MONOCYTES NFR BLD: 8.6 %
NEUT ABS #: 5.85 K/UL (ref 1.4–6.5)
NEUTROPHILS # BLD AUTO: 0.5 %
NEUTROPHILS NFR BLD AUTO: 80.2 %
PMV BLD AUTO: 8.5 FL (ref 7.4–10.4)
POTASSIUM SERPL-SCNC: 4.5 MMOL/L (ref 3.5–5.1)
PROT SERPL-MCNC: 6.4 GM/DL (ref 6.4–8.2)
RED CELL DISTRIBUTION WIDTH CV: 15.2 % (ref 11.5–14.5)
RED CELL DISTRIBUTION WIDTH SD: 55.4 FL (ref 36.4–46.3)
SODIUM SERPL-SCNC: 135 MMOL/L (ref 136–145)
WBC # BLD AUTO: 7.3 K/UL (ref 4.8–10.8)

## 2018-04-25 RX ADMIN — IPRATROPIUM BROMIDE SCH MG: 0.5 SOLUTION RESPIRATORY (INHALATION) at 01:49

## 2018-04-25 RX ADMIN — IPRATROPIUM BROMIDE PRN MG: 0.5 SOLUTION RESPIRATORY (INHALATION) at 23:48

## 2018-04-25 RX ADMIN — GUAIFENESIN SCH MG: 600 TABLET, EXTENDED RELEASE ORAL at 20:31

## 2018-04-25 RX ADMIN — GUAIFENESIN SCH MG: 600 TABLET, EXTENDED RELEASE ORAL at 07:26

## 2018-04-25 RX ADMIN — AZITHROMYCIN SCH MG: 250 TABLET, FILM COATED ORAL at 07:27

## 2018-04-25 RX ADMIN — FERROUS GLUCONATE SCH MG: 324 TABLET ORAL at 16:42

## 2018-04-25 RX ADMIN — IPRATROPIUM BROMIDE SCH MG: 0.5 SOLUTION RESPIRATORY (INHALATION) at 14:07

## 2018-04-25 RX ADMIN — DOCUSATE SODIUM SCH MG: 100 CAPSULE, LIQUID FILLED ORAL at 07:26

## 2018-04-25 RX ADMIN — Medication SCH MG: at 07:26

## 2018-04-25 RX ADMIN — ATORVASTATIN CALCIUM SCH MG: 40 TABLET, FILM COATED ORAL at 20:31

## 2018-04-25 RX ADMIN — LEVALBUTEROL PRN MG: 1.25 SOLUTION, CONCENTRATE RESPIRATORY (INHALATION) at 23:49

## 2018-04-25 RX ADMIN — LEVALBUTEROL SCH MG: 1.25 SOLUTION, CONCENTRATE RESPIRATORY (INHALATION) at 01:49

## 2018-04-25 RX ADMIN — FERROUS GLUCONATE SCH MG: 324 TABLET ORAL at 11:46

## 2018-04-25 RX ADMIN — SODIUM CHLORIDE, SODIUM LACTATE, POTASSIUM CHLORIDE, AND CALCIUM CHLORIDE SCH MLS/HR: 600; 310; 30; 20 INJECTION, SOLUTION INTRAVENOUS at 20:32

## 2018-04-25 RX ADMIN — NICOTINE SCH PATCH: 7 PATCH, EXTENDED RELEASE TRANSDERMAL at 12:18

## 2018-04-25 RX ADMIN — LEVALBUTEROL SCH MG: 1.25 SOLUTION, CONCENTRATE RESPIRATORY (INHALATION) at 07:05

## 2018-04-25 RX ADMIN — LEVALBUTEROL SCH MG: 1.25 SOLUTION, CONCENTRATE RESPIRATORY (INHALATION) at 14:07

## 2018-04-25 RX ADMIN — SODIUM CHLORIDE, SODIUM LACTATE, POTASSIUM CHLORIDE, AND CALCIUM CHLORIDE SCH MLS/HR: 600; 310; 30; 20 INJECTION, SOLUTION INTRAVENOUS at 05:02

## 2018-04-25 RX ADMIN — IPRATROPIUM BROMIDE SCH MG: 0.5 SOLUTION RESPIRATORY (INHALATION) at 07:05

## 2018-04-25 RX ADMIN — RIVAROXABAN SCH MG: 20 TABLET, FILM COATED ORAL at 16:42

## 2018-04-25 RX ADMIN — VENLAFAXINE HYDROCHLORIDE SCH MG: 75 CAPSULE, EXTENDED RELEASE ORAL at 07:26

## 2018-04-25 RX ADMIN — MORPHINE SULFATE PRN MG: 2 INJECTION, SOLUTION INTRAMUSCULAR; INTRAVENOUS at 18:16

## 2018-04-25 RX ADMIN — IPRATROPIUM BROMIDE SCH MG: 0.5 SOLUTION RESPIRATORY (INHALATION) at 19:14

## 2018-04-25 RX ADMIN — FERROUS GLUCONATE SCH MG: 324 TABLET ORAL at 07:25

## 2018-04-25 RX ADMIN — Medication SCH EA: at 20:32

## 2018-04-25 RX ADMIN — SODIUM CHLORIDE, SODIUM LACTATE, POTASSIUM CHLORIDE, AND CALCIUM CHLORIDE SCH MLS/HR: 600; 310; 30; 20 INJECTION, SOLUTION INTRAVENOUS at 12:18

## 2018-04-25 RX ADMIN — LEVALBUTEROL SCH MG: 1.25 SOLUTION, CONCENTRATE RESPIRATORY (INHALATION) at 19:14

## 2018-04-25 RX ADMIN — METOPROLOL SUCCINATE SCH MG: 50 TABLET, EXTENDED RELEASE ORAL at 20:31

## 2018-04-25 NOTE — CARDIOLOGY CONSULTATION
Cardiology Consultation


Date of Consultation:


2018.


Requesting Physician:


Dr. Driscoll


Attending Physician:


Dr. Perdue


Reason for Consultation:


Atrial flutter and syncope


Pt evaluation today including:  conversation w/ patient, physical exam, chart 

review, lab review, review of studies, review of inpatient medication list, 

conversation w/ attending


History of Present Illness


Ms. Dougherty is a very pleasant 62-year-old female with a history significant for 

bipolar disorder, alcoholism, mitral valve endocarditis, paroxysmal atrial 

fibrillation/flutter, hypertension, and dyslipidemia.





In 2015 she was found to have MSSA bacteremia and hospitalized at 

[facility] when she presented with unresponsive episode.  She 

had paroxysmal atrial fibrillation during that hospitalization.  She had 

positive troponins and an abnormal echocardiogram and was later discharged to 

psychiatry.  She re-presented to [facility] in 2015 

with  general may lays, right knee pain, and right shoulder pain.  She was 

found to have MSSA bacteremia, positive troponins, and mitral regurgitation. 

She underwent transesophageal echo on the found have mitral valve endocarditis.

   She continued to have paroxysmal atrial fibrillation.  Mental status changes 

prompted a CT scan which demonstrated embolic events and she was transferred to 

[facility].  She was discharged there on 2015 after 

undergoing explantation of right knee hardware for septic arthritis.  She did 

not require mitral valve replacement.  She was  discharged on amiodarone for 

atrial fibrillation.  An MRA demonstrated aneurysm of left cavernous internal 

carotid artery. She has completed 6 weeks of intravenous antibiotics.  During 

her hospitalization, there was concern for subarachnoid hemorrhage, but Dr. Orozco of Neurosurgery did not believe that she had evidence of subarachnoid 

hemorrhage.





She has had the following studies/procedures:


1. Transesophageal echo 2015:  EF 55%.  Distal inferior wall and distal 

septum appear  akinetic.  Buchanan akinetic.  Remaining segments appear 

hyperkinetic.  Moderate left atrial dilation.  Mobile  vegetation (0.8 x 0.4 cm

) on anterior leaflet (A1).  Mobile vegetation (0.7 x 0.3 cm) on posterior 

leaflet (P2).  Moderate to severe mitral regurgitation.  Mild to moderate TR.  

RVSP 40-50mmHg.


2. Echo 2015:  Normal LV systolic function.  EF 65-70%.  Mild LVH.  Mild 

left atrial dilation.  Trace AI.  Mild to moderate anteriorly directed MR.  No 

visualized vegetation.


3. Echo 2016:  Normal LV size, wall motion, systolic function.  EF 65-70%

.  Moderate left atrial dilation.  Probable moderate eccentric, anteriorly  

directed, MR.  No visualized vegetation.


4. Event monitor 2016 to 2016:  Sinus rhythm and sinus bradycardia.

  No arrhythmia.  No symptoms reported.


5. Echo 2017:  Normal LV size.  Hyperdynamic systolic function.  EF 65-70%

.  Normal wall motion. Type 1 diastolic dysfunction.  Mildly dilated left 

atrium.  Moderate anteriorly directed MR.





She was admitted to [facility] yesterday with shortness of breath. For the past 4-6 

weeks she had been weak and short of breath.  She was found to be hypokalemic.  

She states that she started eating foods higher in potassium content and her 

weakness and shortness of breath improved.  Then 6 days ago, she was taking a 

bus to [facility] to attend a  for her uncle.  There was a stop in 

[place].  She got off the bus and while standing, waiting to board the bus 

again, she felt lightheaded for 1 second and then had a syncopal event.  She 

believes she was out only seconds.  When she awakened, she felt completely back 

to normal.  She denied palpitations, chest pain, shortness of breath, or other 

symptoms prior or following the event.  





She injured her tailbone area.  She states that she does have syncopal events 

once every few months.  There is no specific pattern.  She only feels a warning 

for a second or so, consisting of lightheadedness.  She always feels better 

immediately after awakening.





While at her sister's house, she felt short of breath.  She lay down on the 

couch.  She felt hot and sweaty.  Then she felt cold.  This all occurred over 

30 minutes.  There was no chest pain.  They called EMS.  She was quite short of 

breath by this time.  She had labs drawn such as a BNP and was discharged with 

a diagnosis of his COPD exacerbation.  Lab results and diagnostic studies were 

not available at the time of our discussion this morning at approximately 9:10 

a.m..  She remembers being given a nebulizer treatment.





Her shortness of breath or is better when laying supine.  She has dyspnea with 

exertion.  Nursing staff has stated that they have been unable to wean her 

oxygen less than 2 L per nasal cannula.  She has occasional palpitations.





She has been taking her medications as prescribed per her report.  She denies 

missing any doses of anticoagulation or beta-blocker.  She admits that she 

drinks 12 oz of vodka every day up until this past Friday.  A 2 L bottle of 

vodka lasts barely over 1 week.  She smokes cigarettes occasionally, stating 

that she does not smoke daily.





She denies melena, hematochezia, hematuria, or other bleeding.  She denies 

fevers.





Review of systems:  As above and review of systems otherwise negative/

unremarkable.





Past Medical/Surgical History


1.  Atrial fibrillation/flutter


2.  Mitral regurgitation


3. Mitral valve endocarditis with MSSA


4. Alcohol abuse


5. Tobacco abuse


6. Dyslipidemia


7. Cerebral arterial aneurysm


8. GERD


9. Hyperglycemia


10. Hypertension


11. Lumbar canal stenosis


12. Lumbar radiculopathy


13. QT prolongation on amiodarone 200 mg


14. Cataract


15. COPD





Family History





Diabetes mellitus


  BROTHER


Heart disease


Hypertension


  FATHER


  MOTHER


Lung disease


  MOTHER


Brother passed away at the age of 46 with diabetes and depression. No known 

premature CAD.





Social History


Smokes cigarettes occasionally.  She is in alcoholic.  She admits today that 

she drinks 12 oz of vodka daily up until the past week.  She is .  No 

children.  She was alone in her hospital room today.  She has 2 sisters, Judy 

and Catie.


All Other Systems:  Reviewed and Negative





Allergies


Coded Allergies:  


     Metronidazole (Unverified  Allergy, Unknown, RASH, 18)


     Molds and Smuts (Verified  Allergy, Unknown, sneezing, 18)





Medications


Home medications include:


1. Metoprolol succinate total of 100 mg daily


2.  Losartan 100 mg daily


3. Atorvastatin 40 mg daily


4. Xarelto


5. SBE prophylaxis with amoxicillin


6. Please see full list.








Current Inpatient Medications








 Medications


  (Trade)  Dose


 Ordered  Sig/Kaet


 Route  Start Time


 Stop Time Status Last Admin


Dose Admin


 


 Aspirin


  (Ecotrin Tab)  81 mg  QAM


 PO  18 09:00


 18 08:59  18 07:26


81 MG


 


 Atorvastatin


 Calcium


  (Lipitor Tab)  40 mg  HS


 PO  18 21:00


 18 20:59  18 22:35


40 MG


 


 Docusate Sodium


  (coLACE CAP)  100 mg  DAILY


 PO  18 09:00


 18 08:59  18 07:26


100 MG


 


 Ferrous Gluconate


  (Ferrous


 Gluconate Tab)  324 mg  TIDM


 PO  18 07:30


 18 07:59  18 11:46


324 MG


 


 Rivaroxaban


  (Xarelto Tab)  20 mg  QDD


 PO  18 16:45


 18 16:44   


 


 


 Venlafaxine HCl


  (effeXOR


 EXTENDED REL CAP)  75 mg  QAM


 PO  18 09:00


 18 08:59  18 07:26


75 MG


 


 Lactated Ringer's  1,000 ml @ 


 125 mls/hr  Q8H


 IV  18 21:00


 18 20:59  18 12:18


125 MLS/HR


 


 Guaifenesin


  (Mucinex Contr


 Rel Tab)  600 mg  Q12


 PO  18 21:00


 18 20:59  18 07:26


600 MG


 


 Azithromycin


  (Zithromax Tab)  250 mg  QAM


 PO  18 09:00


 18 08:59  18 07:27


250 MG


 


 Ipratropium


 Bromide


  (Atrovent 0.02%


 0.5MG/2.5ML Neb)  0.5 mg  Q6R


 INH  18 03:00


 18 02:59  18 14:07


0.5 MG


 


 Levalbuterol


  (Xopenex 1.25MG/


 0.5ML Neb)  1.25 mg  Q6R


 INH  18 03:00


 18 02:59  18 14:07


1.25 MG


 


 Ipratropium


 Bromide


  (Atrovent 0.02%


 0.5MG/2.5ML Neb)  0.5 mg  Q2H  PRN


 INH  18 21:30


 18 21:29   


 


 


 Levalbuterol


  (Xopenex 1.25MG/


 0.5ML Neb)  1.25 mg  Q2H  PRN


 INH  18 21:30


 18 21:29   


 


 


 Ketorolac


 Tromethamine


  (Toradol Inj)  30 mg  Q6H  PRN


 IV  18 06:45


 18 06:44 Future Hold 18 07:27


30 MG


 


 Lidocaine


  (Lidoderm Patch


 5%)  1 patch  QAM


 TD  18 09:00


 18 08:59   


 


 


 Miscellaneous


  (Remove Lidoderm


 Patch)  1 ea  DAILY@21


 N/A  18 21:00


 18 20:59   


 


 


 Nicotine


  (Nicoderm Cq 7


 Mg Patch)  1 patch  QAM


 TD  18 12:00


 18 11:59  18 12:18


1 PATCH


 


 Miscellaneous


  (Remove Nicoderm


 Patch)  1 ea  HS


 N/A  18 21:00


 18 20:59   


 


 


 Metoprolol


 Succinate


  (Toprol Xl Tab)  100 mg  BID


 PO  18 21:00


 18 08:59   


 


 


 Prednisone


  (PredniSONE TAB)  60 mg  DAILY


 PO  18 09:00


 18 08:59 UNV  


 


 


 Prednisone


  (PredniSONE TAB)  60 mg  1527  ONCE


 PO  18 15:27


 18 15:28 UNV  


 











Physical Exam





Vital Signs Past 12 Hours








  Date Time  Temp Pulse Resp B/P (MAP) Pulse Ox O2 Delivery O2 Flow Rate FiO2


 


18 14:09  95 18  93 Nasal Cannula 2.0 


 


18 12:00      Room Air  


 


18 10:30 36.8 91 20 138/83 (101) 91 Room Air  


 


18 08:00      Nasal Cannula 2.0 


 


18 07:45 36.4 104 18 152/97 (115) 90  2.0 


 


18 07:05  94 16  94 Nasal Cannula 2.0 


 


18 04:00      Nasal Cannula 2.0 








Gen.: No acute distress. Alert and oriented.


HEENT: Anicteric sclera.


Neck: No JVD. No bruits. Normal carotid upstrokes bilaterally.


Cardiac: PMI was nondisplaced. No ventricular heave. Irregularly irregular. 

Normal S1-S2. No murmurs, rubs, or gallops.


Pulmonary:  Decreased breath sounds bilaterally with mild expiratory wheezing 

bilateral lung fields.


Abdomen: Soft, nontender, nondistended, with normoactive bowel sounds. No 

bruits noted.


Extremities: 2+ radial pulses bilaterally. 2+ posterior tibialis pulses 

bilaterally. No edema or cyanosis.  No splinter hemorrhages, Osler's nodes, or 

Janeway lesions.


Psychiatric: Affect appears appropriate.





Data


Laboratory Results:





Last 24 Hours








Test


  18


20:50 18


21:24 18


06:05


 


Urine Color YELLOW   


 


Urine Appearance CLEAR   


 


Urine pH 6.0   


 


Urine Specific Gravity 1.010   


 


Urine Protein NEG   


 


Urine Glucose (UA) NEG   


 


Urine Ketones NEG   


 


Urine Occult Blood NEG   


 


Urine Nitrite NEG   


 


Urine Bilirubin NEG   


 


Urine Urobilinogen NEG   


 


Urine Leukocyte Esterase TRACE   


 


Urine WBC (Auto) 10-30 /hpf   


 


Urine RBC (Auto) 0-4 /hpf   


 


Urine Hyaline Casts (Auto) 5-10 /lpf   


 


Urine Epithelial Cells (Auto) >30 /lpf   


 


Urine Bacteria (Auto) NEG   


 


Urine Renal Epithelial Cells  /lpf   


 


Urine Yeast (Auto) BUDDING   


 


Troponin I  < 0.015 ng/ml  < 0.015 ng/ml 


 


Procalcitonin  0.14 ng/ml  


 


Hepatitis C Antibody Screen  NEG  


 


White Blood Count   7.30 K/uL 


 


Red Blood Count   3.70 M/uL 


 


Hemoglobin   12.4 g/dL 


 


Hematocrit   37.2 % 


 


Mean Corpuscular Volume   100.5 fL 


 


Mean Corpuscular Hemoglobin   33.5 pg 


 


Mean Corpuscular Hemoglobin


Concent 


  


  33.3 g/dl 


 


 


Platelet Count   191 K/uL 


 


Mean Platelet Volume   8.5 fL 


 


Neutrophils (%) (Auto)   80.2 % 


 


Lymphocytes (%) (Auto)   10.0 % 


 


Monocytes (%) (Auto)   8.6 % 


 


Eosinophils (%) (Auto)   0.5 % 


 


Basophils (%) (Auto)   0.3 % 


 


Neutrophils # (Auto)   5.85 K/uL 


 


Lymphocytes # (Auto)   0.73 K/uL 


 


Monocytes # (Auto)   0.63 K/uL 


 


Eosinophils # (Auto)   0.04 K/uL 


 


Basophils # (Auto)   0.02 K/uL 


 


RDW Standard Deviation   55.4 fL 


 


RDW Coefficient of Variation   15.2 % 


 


Immature Granulocyte % (Auto)   0.4 % 


 


Immature Granulocyte # (Auto)   0.03 K/uL 


 


Sodium Level   135 mmol/L 


 


Potassium Level   4.5 mmol/L 


 


Chloride Level   104 mmol/L 


 


Carbon Dioxide Level   26 mmol/L 


 


Anion Gap   5.0 mmol/L 


 


Blood Urea Nitrogen   19 mg/dl 


 


Creatinine   1.32 mg/dl 


 


Est Creatinine Clear Calc


Drug Dose 


  


  47.2 ml/min 


 


 


Estimated GFR (


American) 


  


  49.6 


 


 


Estimated GFR (Non-


American 


  


  42.8 


 


 


BUN/Creatinine Ratio   14.6 


 


Random Glucose   100 mg/dl 


 


Calcium Level   8.7 mg/dl 


 


Magnesium Level   2.0 mg/dl 


 


Total Bilirubin   0.6 mg/dl 


 


Aspartate Amino Transf


(AST/SGOT) 


  


  25 U/L 


 


 


Alanine Aminotransferase


(ALT/SGPT) 


  


  18 U/L 


 


 


Alkaline Phosphatase   112 U/L 


 


Total Protein   6.4 gm/dl 


 


Albumin   3.0 gm/dl 


 


Globulin   3.4 gm/dl 


 


Albumin/Globulin Ratio   0.9 








ECG personally reviewed.


ECG 2018:  Atrial flutter with variable AV block.  111 bpm.  

Anterolateral ST/T-wave abnormality.





Telemetry personally reviewed.  Atrial fibrillation.





Chest x-ray reported as no acute cardiopulmonary findings.





Assessment & Plan


ASSESSMENT/PLAN:





1. Atrial fibrillation/flutter:  Her heart rate has improved after receiving 

some IV fluids.  She is currently on her home dose of beta-blocker total of 100 

mg metoprolol succinate.  Original plan was to increase metoprolol however Dr. Rees is concern that it may worsen bronchospasm.  Will therefore continue 

home dose of beta-blocker and start diltiazem 120 mg daily.  Continue 

anticoagulation for stroke risk reduction if no contraindications.





2.  Shortness of breath:  Likely COPD exacerbation.  She appears euvolemic.





3. Alcohol abuse:  Recommended that she stop drinking altogether.  She does not 

appear to have any withdrawal symptoms at this time.





4. Tobacco abuse:  Recommended that she stop smoking altogether.





5. Mitral regurgitation:  Non severe.  Continue to follow.





6. Syncope:  Concerning that her syncope occurred while standing and she has 

intermittent episodes.  She has had event monitor in the past.  Consider loop 

recorder.  We will discuss with electrophysiology.  Will repeat echocardiogram.





7. History of endocarditis:  Blood cultures are pending.  No fevers.  No other 

obvious signs of infection.





8. Disposition:  Cardiology will continue to follow.  Plan of care has been 

discussed with attending physician, Dr. Rees.





Thank you for allowing me to participate in the care of your patient. Please 

call for any other questions or concerns.





Sincerely,





Vega Leiva M.D.

## 2018-04-25 NOTE — FAMILY MEDICINE PROGRESS NOTE
Progress Note


Date of Service


Apr 25, 2018.





Subjective


Pt evaluation today including:  conversation w/ patient, physical exam, chart 

review, lab review, review of studies, conversation w/ consultant, review of 

inpatient medication list


Patient denies acute overnight events. States she continues to feel fatigue and 

SOB, which worsens with conversation. She does not use oxygen at baseline. She 

also complains of significant cough. She admits to smoking half a pack daily, 

and has been a smoker for 30 years but has not smoked for the last 5 days. She 

denies associated CP or palpitations. She has had atrial fibrillation for years 

and is usually not symptomatic with it. She denies fevers/chills, headaches, 

abdominal pain, lower extremity swelling or rashes. She has decreased appetite. 

She has not been ambulating since her fall last week due to sacral discomfort. 

She denies issues with voiding and stooling.





ROS is unremarkable except as noted above.





Objective


Vital Signs











  Date Time  Temp Pulse Resp B/P (MAP) Pulse Ox O2 Delivery O2 Flow Rate FiO2


 


4/25/18 07:45 36.4 104 18 152/97 (115) 90  2.0 


 


4/25/18 07:05  94 16  94 Nasal Cannula 2.0 


 


4/25/18 04:00      Nasal Cannula 2.0 


 


4/25/18 03:33 36.7 99 20 120/84 (96) 94 Nasal Cannula 2.0 


 


4/25/18 01:49  91 16  92 Nasal Cannula 1.5 


 


4/25/18 00:00      Nasal Cannula 2.0 


 


4/24/18 23:48 36.8 92 17 117/69 (85) 94 Nasal Cannula 2.0 


 


4/24/18 21:01 36.4 93 18 141/77 98 Nasal Cannula 2.0 


 


4/24/18 20:16  98 18 152/99 99 Nasal Cannula 2.0 


 


4/24/18 19:05  92 20 169/100 91   


 


4/24/18 15:36  102 18 147/90 98 Room Air  


 


4/24/18 14:46    145/109 92   


 


4/24/18 14:37  104 21  91   


 


4/24/18 14:31    121/92  Nasal Cannula 2.0 


 


4/24/18 14:16    146/90    


 


4/24/18 14:07  96 14     


 


4/24/18 14:02    117/85    


 


4/24/18 14:01    136/112    


 


4/24/18 13:40  118 29     


 


4/24/18 13:36  108      


 


4/24/18 13:10     94 Room Air  


 


4/24/18 13:10  107 23  93   


 


4/24/18 13:08      Room Air  


 


4/24/18 13:04    119/87    


 


4/24/18 12:49 36.4 95 20 85/69 93 Room Air  











Physical Exam


General Appearance:  WD/WN, no apparent distress, + mild distress (Lying 

rotated on left hip secondary to sacral pain), + pertinent finding (NC in situ 

on 2L O2)


Eyes:  normal inspection


ENT:  hearing grossly normal


Neck:  supple


Respiratory/Chest:  no respiratory distress, + accessory muscle use (as 

conversation progresses), + wheezing (expiratory wheezing diffusely)


Cardiovascular:  + systolic murmur (3/6), + irregularly irregular, + pertinent 

finding (not tachycardic)


Abdomen:  normal bowel sounds, non tender, soft


Extremities:  no pedal edema, no calf tenderness


Neurologic/Psychiatric:  alert, normal mood/affect, oriented x 3


Skin:  normal color, warm/dry, no rash





Laboratory Results





Results Past 24 Hours








Test


  4/24/18


13:23 4/24/18


13:40 4/24/18


20:50 4/24/18


21:24 Range/Units


 


 


Influenza Type A (RT-PCR) Neg for Influ A    NEG  


 


Influenza Type B (RT-PCR) Neg for Influ B    NEG  


 


White Blood Count  7.66   4.8-10.8  K/uL


 


Red Blood Count  4.27   4.2-5.4  M/uL


 


Hemoglobin  14.7   12.0-16.0  g/dL


 


Hematocrit  42.4   37-47  %


 


Mean Corpuscular Volume  99.3     fL


 


Mean Corpuscular Hemoglobin  34.4   25-34  pg


 


Mean Corpuscular Hemoglobin


Concent 


  34.7


  


  


  32-36  g/dl


 


 


Platelet Count  266   130-400  K/uL


 


Mean Platelet Volume  8.8   7.4-10.4  fL


 


Neutrophils (%) (Auto)  73.0    %


 


Lymphocytes (%) (Auto)  11.7    %


 


Monocytes (%) (Auto)  13.8    %


 


Eosinophils (%) (Auto)  0.3    %


 


Basophils (%) (Auto)  0.3    %


 


Neutrophils # (Auto)  5.59   1.4-6.5  K/uL


 


Lymphocytes # (Auto)  0.90   1.2-3.4  K/uL


 


Monocytes # (Auto)  1.06   0.11-0.59  K/uL


 


Eosinophils # (Auto)  0.02   0-0.5  K/uL


 


Basophils # (Auto)  0.02   0-0.2  K/uL


 


RDW Standard Deviation  54.1   36.4-46.3  fL


 


RDW Coefficient of Variation  15.1   11.5-14.5  %


 


Immature Granulocyte % (Auto)  0.9    %


 


Immature Granulocyte # (Auto)  0.07   0.00-0.02  K/uL


 


Prothrombin Time


  


  10.7


  


  


  9.0-12.0


SECONDS


 


Prothromb Time International


Ratio 


  1.0


  


  


  0.9-1.1  


 


 


Activated Partial


Thromboplast Time 


  26.9


  


  


  21.0-31.0


SECONDS


 


Partial Thromboplastin Ratio  1.0    


 


Sodium Level  132   136-145  mmol/L


 


Potassium Level  3.3   3.5-5.1  mmol/L


 


Chloride Level  97     mmol/L


 


Carbon Dioxide Level  26   21-32  mmol/L


 


Anion Gap  9.0   3-11  mmol/L


 


Blood Urea Nitrogen  19   7-18  mg/dl


 


Creatinine


  


  1.27


  


  


  0.60-1.20


mg/dl


 


Estimated GFR (


American) 


  52.0


  


  


   


 


 


Estimated GFR (Non-


American 


  44.9


  


  


   


 


 


BUN/Creatinine Ratio  15.1   10-20  


 


Random Glucose  100   70-99  mg/dl


 


Lactic Acid Level  2.0   0.4-2.0  mmol/L


 


Calcium Level  9.2   8.5-10.1  mg/dl


 


Magnesium Level  1.6   1.8-2.4  mg/dl


 


Total Bilirubin  0.7   0.2-1  mg/dl


 


Aspartate Amino Transf


(AST/SGOT) 


  28


  


  


  15-37  U/L


 


 


Alanine Aminotransferase


(ALT/SGPT) 


  23


  


  


  12-78  U/L


 


 


Alkaline Phosphatase  136     U/L


 


Troponin I  < 0.015  < 0.015 0-0.045  ng/ml


 


Total Protein  7.7   6.4-8.2  gm/dl


 


Albumin  3.7   3.4-5.0  gm/dl


 


Globulin  4.0   2.5-4.0  gm/dl


 


Albumin/Globulin Ratio  0.9   0.9-2  


 


Thyroid Stimulating Hormone


(TSH) 


  1.480


  


  


  0.300-4.500


uIu/ml


 


Urine Color   YELLOW   


 


Urine Appearance   CLEAR  CLEAR  


 


Urine pH   6.0  4.5-7.5  


 


Urine Specific Gravity   1.010  1.000-1.030  


 


Urine Protein   NEG  NEG  


 


Urine Glucose (UA)   NEG  NEG  


 


Urine Ketones   NEG  NEG  


 


Urine Occult Blood   NEG  NEG  


 


Urine Nitrite   NEG  NEG  


 


Urine Bilirubin   NEG  NEG  


 


Urine Urobilinogen   NEG  NEG  


 


Urine Leukocyte Esterase   TRACE  NEG  


 


Urine WBC (Auto)   10-30  0-5  /hpf


 


Urine RBC (Auto)   0-4  0-4  /hpf


 


Urine Hyaline Casts (Auto)   5-10  0-5  /lpf


 


Urine Epithelial Cells (Auto)   >30  0-5  /lpf


 


Urine Bacteria (Auto)   NEG  NEG  


 


Urine Renal Epithelial Cells     0-5  /lpf


 


Urine Yeast (Auto)   BUDDING  NONE PRSENT  


 


Procalcitonin    0.14 0-0.5  ng/ml


 


Hepatitis C Antibody Screen    NEG NEG  


 


Test


  4/25/18


06:05 


  


  


  Range/Units


 


 


White Blood Count 7.30    4.8-10.8  K/uL


 


Red Blood Count 3.70    4.2-5.4  M/uL


 


Hemoglobin 12.4    12.0-16.0  g/dL


 


Hematocrit 37.2    37-47  %


 


Mean Corpuscular Volume 100.5      fL


 


Mean Corpuscular Hemoglobin 33.5    25-34  pg


 


Mean Corpuscular Hemoglobin


Concent 33.3


  


  


  


  32-36  g/dl


 


 


Platelet Count 191    130-400  K/uL


 


Mean Platelet Volume 8.5    7.4-10.4  fL


 


Neutrophils (%) (Auto) 80.2     %


 


Lymphocytes (%) (Auto) 10.0     %


 


Monocytes (%) (Auto) 8.6     %


 


Eosinophils (%) (Auto) 0.5     %


 


Basophils (%) (Auto) 0.3     %


 


Neutrophils # (Auto) 5.85    1.4-6.5  K/uL


 


Lymphocytes # (Auto) 0.73    1.2-3.4  K/uL


 


Monocytes # (Auto) 0.63    0.11-0.59  K/uL


 


Eosinophils # (Auto) 0.04    0-0.5  K/uL


 


Basophils # (Auto) 0.02    0-0.2  K/uL


 


RDW Standard Deviation 55.4    36.4-46.3  fL


 


RDW Coefficient of Variation 15.2    11.5-14.5  %


 


Immature Granulocyte % (Auto) 0.4     %


 


Immature Granulocyte # (Auto) 0.03    0.00-0.02  K/uL


 


Sodium Level 135    136-145  mmol/L


 


Potassium Level 4.5    3.5-5.1  mmol/L


 


Chloride Level 104      mmol/L


 


Carbon Dioxide Level 26    21-32  mmol/L


 


Anion Gap 5.0    3-11  mmol/L


 


Blood Urea Nitrogen 19    7-18  mg/dl


 


Creatinine


  1.32


  


  


  


  0.60-1.20


mg/dl


 


Est Creatinine Clear Calc


Drug Dose 47.2


  


  


  


   ml/min


 


 


Estimated GFR (


American) 49.6


  


  


  


   


 


 


Estimated GFR (Non-


American 42.8


  


  


  


   


 


 


BUN/Creatinine Ratio 14.6    10-20  


 


Random Glucose 100    70-99  mg/dl


 


Calcium Level 8.7    8.5-10.1  mg/dl


 


Magnesium Level 2.0    1.8-2.4  mg/dl


 


Total Bilirubin 0.6    0.2-1  mg/dl


 


Aspartate Amino Transf


(AST/SGOT) 25


  


  


  


  15-37  U/L


 


 


Alanine Aminotransferase


(ALT/SGPT) 18


  


  


  


  12-78  U/L


 


 


Alkaline Phosphatase 112      U/L


 


Troponin I < 0.015    0-0.045  ng/ml


 


Total Protein 6.4    6.4-8.2  gm/dl


 


Albumin 3.0    3.4-5.0  gm/dl


 


Globulin 3.4    2.5-4.0  gm/dl


 


Albumin/Globulin Ratio 0.9    0.9-2  








Microbiology Results


4/24/18 Blood Culture, Received


          Pending


4/24/18 Blood Culture, Received


          Pending


4/24/18 Urine Culture, Received


          Pending





Assessment and Plan


62 year old female here for atrial flutter with rapid rate and hypotension





COPD exacerbation with acute hypoxic respiratory failure (mild obstructive 

disease on most recent PFTs, FEV1/FVC 65) - no pneumonia on CXR. - no further 

infection found on history to suggest need for other antibiotics (awaiting CT 

head to also assess sinuses)


- O2 via NC, maintain sats >92. Wean as tolerated


- Continue azithromycin - day 2


- Continue Levalbuterol and Atrovent nebulizers


- IV methylprednisone 125mg today, switch to PO prednisone 60mg tomorrow





Dehydration


- Creatinine today 1.32, baseline <1.0


- Continue IVF -  LR @ 125cc/hr


- Trend BMP





Atrial flutter/fibrillation (chronic) - Cardiology consulted given extensive 

cardiac history with a.fib mx and h/o endocarditis - recommendations 

appreciated. Serial trop x 3 negative


-  MLS/HR, monitor for heart failure given mitral regurgitation


- Continue metoprolol 50mg BID. Added diltiazem 120mg daily.


- Anticoagulation with Xarelto


- Given ischemic changes noted on EKG (most likely rate related), will get 

daily EKG and echo ordered.





Cardiac sounding syncope - non acute. Cardiology consulted - recommendations 

appreciated 


- Consideration for loop recorder pending discussion with electrophysiology





Hx fall with sacral fracture - CT shows <1cm displacement, no neurological 

deficit on exam


- CT head negative


- Orthopedics consulted


- Pain management: acetaminophen, heat pack, lidocaine patch. Avoid NSAIDs due 

to Xarelto use





Anxiety, Bipolar, depression


- Continue venlafaxine and Lamictal





HTN


- Continue metoprolol. Diltiazem added, as above.


- Losartan held due to elevated Cr and hypotension on arrival





Tobacco abuse 


- Encourage cessation


- Nicotine patch ordered





Alcohol abuse


- Encourage cessation


- Monitor for signs of withdrawal. AWSS protocol not initiated at this time as 

patient claims >5 days since last drink.





VTE Prophylaxis


- Anticoagulated with Xarelto





Code


- DNR 





Disposition


- admit to telemetry for cardiac monitoring





Resident Physician Supervision Note:





I was present with Dr. Herrera during the history and exam. I discussed the case 

with the resident and agree with the findings and plan as documented in the 

note.  I also discussed the case with cardiology.





Upon my exam, the patient has decreased air exchange and mid to end exp 

wheezing in all fields.The patient has an extensive smoking history of 1/2 - 1 

ppd for 30 years, although she "quit last Friday." Going forward, suspect we 

will be better off (from a pulmonary standpoint) if we can achieve rate control 

with CCB rather than beta blocker. The amount of beta blockade she would likely 

need would probably exacerbate her lung disease, especially in this acute 

presentation. 





Documented By:  Beck Perdue


Continued [facility] stay due to:  abnormal vital signs


Discharge planning:  home





Resident Tracking


Resident Involvement:  Resident Care Provided


Care Provided:  Adult Hospital Medicine

## 2018-04-26 VITALS — HEART RATE: 84 BPM | OXYGEN SATURATION: 94 %

## 2018-04-26 VITALS
SYSTOLIC BLOOD PRESSURE: 151 MMHG | TEMPERATURE: 98.24 F | HEART RATE: 121 BPM | OXYGEN SATURATION: 99 % | DIASTOLIC BLOOD PRESSURE: 99 MMHG

## 2018-04-26 VITALS
OXYGEN SATURATION: 95 % | DIASTOLIC BLOOD PRESSURE: 88 MMHG | TEMPERATURE: 97.52 F | SYSTOLIC BLOOD PRESSURE: 155 MMHG | HEART RATE: 98 BPM

## 2018-04-26 VITALS
DIASTOLIC BLOOD PRESSURE: 119 MMHG | OXYGEN SATURATION: 97 % | HEART RATE: 108 BPM | SYSTOLIC BLOOD PRESSURE: 156 MMHG | TEMPERATURE: 98.24 F

## 2018-04-26 VITALS — OXYGEN SATURATION: 98 % | HEART RATE: 84 BPM | TEMPERATURE: 98.42 F

## 2018-04-26 VITALS
OXYGEN SATURATION: 94 % | TEMPERATURE: 97.7 F | SYSTOLIC BLOOD PRESSURE: 130 MMHG | DIASTOLIC BLOOD PRESSURE: 85 MMHG | HEART RATE: 89 BPM

## 2018-04-26 VITALS — OXYGEN SATURATION: 84 %

## 2018-04-26 VITALS
HEART RATE: 102 BPM | OXYGEN SATURATION: 93 % | TEMPERATURE: 98.06 F | SYSTOLIC BLOOD PRESSURE: 133 MMHG | DIASTOLIC BLOOD PRESSURE: 101 MMHG

## 2018-04-26 VITALS — HEART RATE: 93 BPM | OXYGEN SATURATION: 97 %

## 2018-04-26 VITALS
DIASTOLIC BLOOD PRESSURE: 88 MMHG | HEART RATE: 96 BPM | OXYGEN SATURATION: 96 % | SYSTOLIC BLOOD PRESSURE: 156 MMHG | TEMPERATURE: 98.06 F

## 2018-04-26 VITALS — OXYGEN SATURATION: 83 % | HEART RATE: 80 BPM

## 2018-04-26 VITALS — OXYGEN SATURATION: 96 %

## 2018-04-26 VITALS — HEART RATE: 95 BPM | OXYGEN SATURATION: 95 %

## 2018-04-26 LAB
BUN SERPL-MCNC: 16 MG/DL (ref 7–18)
CALCIUM SERPL-MCNC: 9 MG/DL (ref 8.5–10.1)
CO2 SERPL-SCNC: 25 MMOL/L (ref 21–32)
CREAT SERPL-MCNC: 0.85 MG/DL (ref 0.6–1.2)
EOSINOPHIL NFR BLD AUTO: 204 K/UL (ref 130–400)
GLUCOSE SERPL-MCNC: 147 MG/DL (ref 70–99)
HCT VFR BLD CALC: 34.8 % (ref 37–47)
HGB BLD-MCNC: 11.9 G/DL (ref 12–16)
MCH RBC QN AUTO: 33.9 PG (ref 25–34)
MCHC RBC AUTO-ENTMCNC: 34.2 G/DL (ref 32–36)
MCV RBC AUTO: 99.1 FL (ref 80–100)
PMV BLD AUTO: 9 FL (ref 7.4–10.4)
POTASSIUM SERPL-SCNC: 3.9 MMOL/L (ref 3.5–5.1)
RED CELL DISTRIBUTION WIDTH CV: 15 % (ref 11.5–14.5)
RED CELL DISTRIBUTION WIDTH SD: 53.7 FL (ref 36.4–46.3)
SODIUM SERPL-SCNC: 135 MMOL/L (ref 136–145)
WBC # BLD AUTO: 6.95 K/UL (ref 4.8–10.8)

## 2018-04-26 PROCEDURE — 0JH602Z INSERTION OF MONITORING DEVICE INTO CHEST SUBCUTANEOUS TISSUE AND FASCIA, OPEN APPROACH: ICD-10-PCS | Performed by: INTERNAL MEDICINE

## 2018-04-26 RX ADMIN — Medication SCH MG: at 07:40

## 2018-04-26 RX ADMIN — IPRATROPIUM BROMIDE SCH MG: 0.5 SOLUTION RESPIRATORY (INHALATION) at 06:58

## 2018-04-26 RX ADMIN — Medication SCH MG: at 07:38

## 2018-04-26 RX ADMIN — MORPHINE SULFATE PRN MG: 2 INJECTION, SOLUTION INTRAMUSCULAR; INTRAVENOUS at 16:19

## 2018-04-26 RX ADMIN — LEVALBUTEROL SCH MG: 1.25 SOLUTION, CONCENTRATE RESPIRATORY (INHALATION) at 19:12

## 2018-04-26 RX ADMIN — AZITHROMYCIN SCH MG: 250 TABLET, FILM COATED ORAL at 07:40

## 2018-04-26 RX ADMIN — NICOTINE SCH PATCH: 7 PATCH, EXTENDED RELEASE TRANSDERMAL at 07:40

## 2018-04-26 RX ADMIN — IPRATROPIUM BROMIDE SCH MG: 0.5 SOLUTION RESPIRATORY (INHALATION) at 02:28

## 2018-04-26 RX ADMIN — MORPHINE SULFATE PRN MG: 2 INJECTION, SOLUTION INTRAMUSCULAR; INTRAVENOUS at 00:41

## 2018-04-26 RX ADMIN — DOCUSATE SODIUM SCH MG: 100 CAPSULE, LIQUID FILLED ORAL at 20:57

## 2018-04-26 RX ADMIN — FERROUS GLUCONATE SCH MG: 324 TABLET ORAL at 16:46

## 2018-04-26 RX ADMIN — GUAIFENESIN SCH MG: 600 TABLET, EXTENDED RELEASE ORAL at 07:38

## 2018-04-26 RX ADMIN — ATORVASTATIN CALCIUM SCH MG: 40 TABLET, FILM COATED ORAL at 20:57

## 2018-04-26 RX ADMIN — METOPROLOL SUCCINATE SCH MG: 50 TABLET, EXTENDED RELEASE ORAL at 20:57

## 2018-04-26 RX ADMIN — METOPROLOL SUCCINATE SCH MG: 50 TABLET, EXTENDED RELEASE ORAL at 07:38

## 2018-04-26 RX ADMIN — RIVAROXABAN SCH MG: 20 TABLET, FILM COATED ORAL at 16:46

## 2018-04-26 RX ADMIN — GUAIFENESIN SCH MG: 600 TABLET, EXTENDED RELEASE ORAL at 20:57

## 2018-04-26 RX ADMIN — SODIUM CHLORIDE, SODIUM LACTATE, POTASSIUM CHLORIDE, AND CALCIUM CHLORIDE SCH MLS/HR: 600; 310; 30; 20 INJECTION, SOLUTION INTRAVENOUS at 04:35

## 2018-04-26 RX ADMIN — IPRATROPIUM BROMIDE SCH MG: 0.5 SOLUTION RESPIRATORY (INHALATION) at 14:09

## 2018-04-26 RX ADMIN — LIDOCAINE SCH PATCH: 50 PATCH CUTANEOUS at 07:39

## 2018-04-26 RX ADMIN — VENLAFAXINE HYDROCHLORIDE SCH MG: 75 CAPSULE, EXTENDED RELEASE ORAL at 07:38

## 2018-04-26 RX ADMIN — Medication SCH EA: at 20:58

## 2018-04-26 RX ADMIN — IPRATROPIUM BROMIDE SCH MG: 0.5 SOLUTION RESPIRATORY (INHALATION) at 19:12

## 2018-04-26 RX ADMIN — LEVALBUTEROL PRN MG: 1.25 SOLUTION, CONCENTRATE RESPIRATORY (INHALATION) at 12:10

## 2018-04-26 RX ADMIN — LEVALBUTEROL SCH MG: 1.25 SOLUTION, CONCENTRATE RESPIRATORY (INHALATION) at 14:09

## 2018-04-26 RX ADMIN — IPRATROPIUM BROMIDE PRN MG: 0.5 SOLUTION RESPIRATORY (INHALATION) at 12:10

## 2018-04-26 RX ADMIN — Medication SCH TAB: at 11:07

## 2018-04-26 RX ADMIN — FERROUS GLUCONATE SCH MG: 324 TABLET ORAL at 07:38

## 2018-04-26 RX ADMIN — Medication SCH MG: at 05:02

## 2018-04-26 RX ADMIN — METHYLPREDNISOLONE SODIUM SUCCINATE SCH MLS/MIN: 1 INJECTION, POWDER, FOR SOLUTION INTRAMUSCULAR; INTRAVENOUS at 20:57

## 2018-04-26 RX ADMIN — LEVALBUTEROL SCH MG: 1.25 SOLUTION, CONCENTRATE RESPIRATORY (INHALATION) at 06:58

## 2018-04-26 RX ADMIN — LEVALBUTEROL SCH MG: 1.25 SOLUTION, CONCENTRATE RESPIRATORY (INHALATION) at 02:28

## 2018-04-26 RX ADMIN — FERROUS GLUCONATE SCH MG: 324 TABLET ORAL at 11:09

## 2018-04-26 RX ADMIN — DOCUSATE SODIUM SCH MG: 100 CAPSULE, LIQUID FILLED ORAL at 07:38

## 2018-04-26 NOTE — [facility] OPERATIVE REPORT
Operative Report


Date of Service


Apr 26, 2018.





Operative Report


The procedure performed:  Implantation of patient activated loop recorder


Staff cardiologist:  Christopher Kocher, MD


Indication:  The patient is a 62-year-old woman with a history of syncope.  The 

patient's symptoms do not involve any prodrome.  She is noted to have a 

arrhythmias such as atrial flutter and fibrillation.  Over concerns regarding 

an arrhythmic cause for her syncope she was advised to consider implantation of 

loop recorder.





Procedure in detail:





The patient was informed of the risks benefits and alternatives to the intended 

procedure.  They understood such and wished to proceed.  The patient was taken 

to the electrophysiology suite where the upper chest area was prepped and 

draped in the usual sterile fashion. An area left lateral to the sternum in the 

4th intercostal space was subsequently anesthetized using subcutaneous 

administration of lidocaine solution.  A small incision was made at this site 

and implantation of the loop recorder was accomplished using a proprietary 

implantation tool.  The small incision was subsequently closed using a single 4 

0 Vicryl suture.  Steri-Strips and a sterile dressing were then applied.  The 

patient tolerated the procedure well.  There were no immediate complications.  

The device was tested noninvasively prior to conclusion of the procedure.





Equipment used:





Patient activated loop recorder:   [employer].  Model number [id number].  

Serial number [id number]


I attest to the content of the Intraoperative Record and any orders documented 

therein.  Any exceptions are noted below.

## 2018-04-26 NOTE — CARDIOLOGY PROCEDURE BRIEF NT
Preliminary Cardiology Note


Procedure Date


Apr 26, 2018.





Pre-Procedure Diagnosis


syncope





Post-Procedure Diagnosis


same





Procedure(s) Performed





IMplant [employer]onic loop recorder





Cardiologist


Kocher





Assistant(s)


none





Estimated Blood Loss


none





Medication(s)





none





Preliminary Findings


successful loop implant with good waveform and sensing





Recommendations


Keep wound dry and steri-strip intact for 5 days





Specimens





none





Complication(s)


None





Disposition


PCU

## 2018-04-26 NOTE — FAMILY MEDICINE PROGRESS NOTE
Progress Note


Date of Service


Apr 26, 2018.





Subjective


Pt evaluation today including:  conversation w/ patient, physical exam, chart 

review, lab review, review of studies, conversation w/ consultant, review of 

inpatient medication list


Patient feeling very anxious this morning and has ongoing dyspnea. She is 

unsure if the dyspnea is related to panic/agitation. She attributes the panic/

agitation to withdrawal from alcohol - she has also had flushing and tremors. 

She states interest in going to rehab for this. She was told that the 

prednisone can also be contributing. She otherwise denies fevers/chills, 

headaches, CP, palpitations, abdominal pain, lower extremity swelling or 

rashes. She has had decreased appetite and poor sleep overnight. She states 

ongoing sacral pain, although the morphine did help. 





ROS is unremarkable except as noted above.





Objective


Vital Signs











  Date Time  Temp Pulse Resp B/P (MAP) Pulse Ox O2 Delivery O2 Flow Rate FiO2


 


4/26/18 06:59  84 20  94 Nasal Cannula 5.0 


 


4/26/18 05:03 36.9 84 21  98 Nasal Cannula 4.0 


 


4/26/18 04:00     84 Nasal Cannula 2.0 





      Humidified Air  


 


4/26/18 00:44 36.5 89 22 130/85 (100) 94 Nasal Cannula 4.0 


 


4/26/18 00:01     84 Nasal Cannula 2.0 





      Humidified Air  


 


4/25/18 23:49  88 22  86 Nasal Cannula 2.0 


 


4/25/18 23:35  98 23 154/129 (137) 84 Nasal Cannula 3.0 


 


4/25/18 23:30   32  84 Nasal Cannula 2.0 


 


4/25/18 20:00      Nasal Cannula 2.0 


 


4/25/18 19:24 36.9 114 20 160/111 (127) 95  2.0 100


 


4/25/18 19:20 36.9 122 22 188/111 (136) 99   


 


4/25/18 19:15  97 20  88 Nasal Cannula 2.0 


 


4/25/18 16:00      Nasal Cannula 2.0 


 


4/25/18 15:33 36.7 94 18 156/83 (107) 98 Nasal Cannula 2.0 


 


4/25/18 14:09  95 18  93 Nasal Cannula 2.0 


 


4/25/18 12:00      Room Air  


 


4/25/18 10:30 36.8 91 20 138/83 (101) 91 Room Air  


 


4/25/18 08:00      Nasal Cannula 2.0 


 


4/25/18 07:45 36.4 104 18 152/97 (115) 90  2.0 











Physical Exam


Notes:


General Appearance:  WD/WN, + mild distress (agitated), + pertinent finding (NC 

in situ on 4L O2)


Eyes:  normal inspection


ENT:  hearing grossly normal


Neck:  supple


Respiratory/Chest:  + respiratory distress (cannot complete entire sentence 

without stopping for breath),, + accessory muscle use (as conversation 

progresses), + wheezing (expiratory wheezing diffusely)


Cardiovascular:  + systolic murmur (3/6), + irregularly irregular, + pertinent 

finding (not tachycardic)


Abdomen:  normal bowel sounds, non tender, soft


Extremities:  no pedal edema, no calf tenderness


Neurologic/Psychiatric:  alert, normal mood/affect, oriented x 3


Skin:  normal color, warm/dry, no rash





Laboratory Results





Results Past 24 Hours








Test


  4/26/18


05:35 4/26/18


10:05 Range/Units


 


 


White Blood Count 6.95  4.8-10.8  K/uL


 


Red Blood Count 3.51  4.2-5.4  M/uL


 


Hemoglobin 11.9  12.0-16.0  g/dL


 


Hematocrit 34.8  37-47  %


 


Mean Corpuscular Volume 99.1    fL


 


Mean Corpuscular Hemoglobin 33.9  25-34  pg


 


Mean Corpuscular Hemoglobin


Concent 34.2


  


  32-36  g/dl


 


 


RDW Standard Deviation 53.7  36.4-46.3  fL


 


RDW Coefficient of Variation 15.0  11.5-14.5  %


 


Platelet Count 204  130-400  K/uL


 


Mean Platelet Volume 9.0  7.4-10.4  fL


 


Sodium Level 135  136-145  mmol/L


 


Potassium Level 3.9  3.5-5.1  mmol/L


 


Chloride Level 103    mmol/L


 


Carbon Dioxide Level 25  21-32  mmol/L


 


Anion Gap 7.0  3-11  mmol/L


 


Blood Urea Nitrogen 16  7-18  mg/dl


 


Creatinine


  0.85


  


  0.60-1.20


mg/dl


 


Est Creatinine Clear Calc


Drug Dose 73.3


  


   ml/min


 


 


Estimated GFR (


American) 84.5


  


   


 


 


Estimated GFR (Non-


American 72.9


  


   


 


 


BUN/Creatinine Ratio 19.3  10-20  


 


Random Glucose 147  70-99  mg/dl


 


Calcium Level 9.0  8.5-10.1  mg/dl











Assessment and Plan


62 year old female here for atrial flutter with rapid rate and hypotension





COPD exacerbation with acute hypoxic respiratory failure (mild obstructive 

disease on most recent PFTs, FEV1/FVC 65) - no pneumonia on CXR. - no further 

infection found on history to suggest need for other antibiotics (awaiting CT 

head to also assess sinuses)


- O2 via NC, maintain sats >92. Wean as tolerated


- Continue azithromycin - day 3


- Continue Levalbuterol and Atrovent nebulizers


- Continue IV methylprednisone 40mg BID





Atrial flutter/fibrillation (chronic) - Cardiology consulted given extensive 

cardiac history with a.fib mx and h/o endocarditis - recommendations 

appreciated. Serial trop x 3 negative


-  MLS/HR, monitor for heart failure given mitral regurgitation


- Continue metoprolol 50mg BID. Added diltiazem 120mg daily.


- Anticoagulation with Xarelto


- Given ischemic changes noted on EKG (most likely rate related), will get 

daily EKG and echo ordered.





Alcohol abuse


- Encourage cessation


- Patient placed on AW protocol for signs of withdrawal


- PO supplementation of thiamine, folic acid and B12 (via multivitamin) - will 

check B12 level to determine if increased supplementation required


- Social service consult for patient request for possible inpatient rehab





Cardiac sounding syncope - non acute. Cardiology consulted - recommendations 

appreciated 


- Consideration for loop recorder pending discussion with electrophysiology 

today





Hx fall with sacral fracture - CT shows <1cm displacement, no neurological 

deficit on exam


- CT head negative


- Orthopedics consulted


- Pain management: acetaminophen, heat pack, lidocaine patch, morphine. Avoid 

NSAIDs due to Xarelto use


- PT/OT ordered


- Scheduled Colace to minimize constipation from morphine and decrease 

straining which could exacerbate pain





Anxiety, Bipolar, depression


- Continue venlafaxine and Lamictal





HTN


- Continue metoprolol. Diltiazem added, as above.


- Losartan held due to elevated Cr and hypotension on arrival.





Tobacco abuse 


- Encourage cessation


- Nicotine patch ordered





Dehydration - resolved


- Creatinine normalized. IVF discontinued


- Trend BMP





VTE Prophylaxis


- Anticoagulated with Xarelto





Code


- DNR 





Resident Physician Supervision Note:





I was present with Dr. Herrera during the history and exam. I discussed the case 

with the resident and agree with the findings and plan as documented in the 

note.  Any exceptions or clarifications are listed here: 





PLAN


1) Continue IV steroids for COPD exacerbation. This may slightly worsen the 

agitation from the ETOH withdrawal, so will look to taper as soon as lungs 

permit.


2) Active ETOH withdrawal protocol. Given addition of steroids as noted above, 

will need to be fairly liberal with benzodiazepines. 


3) Steroids should help sacral pain; need to avoid NSAIDs given her 

anticoagulation. Morphine may be needed PRN.


4) Given her lung disease, may need to decrease beta blockers and increase CCB 

over the next couple of days. 


5) She is open to the idea of inpatient ETOH treatment - consult SS.  





Documented By:  Beck Perdue


Continued [facility] stay due to:  abnormal vital signs


Discharge planning:  uncertain





Resident Tracking


Resident Involvement:  Resident Care Provided


Care Provided:  Adult Hospital Medicine

## 2018-04-26 NOTE — ECHOCARDIOGRAM REPORT
*NOTICE TO RECEIVING PARTY AGENCY**  This information is strictly Confidential and protected under 
[place] law.  [place] law prohibits you from making any further disclosure of this 
information unless further disclosure is expressly permitted by the written consent of the person to 
whom it pertains or is authorized by law.  A general authorization for the release of medical or 
other information is not sufficient for this purpose.  Hospital accepts no responsibility if the 
information is made available to any other person, INCLUDING THE PATIENT.



Interpretation Summary

  *  Name: SERGE MALHOTRA  Study Date: 2018 07:15 AM  BP: 130/85 mmHg

  *  MRN: [id number]  Patient Location: .2E\S\E209\S\1  HR: 84

  *  : 1955 (M/d/yyyy)  Gender: Female  Height: 70 in

  *  Age: 62 yrs  Ethnicity: [place]  Weight: 157 lb

  *  Ordering Physician: Barrera Leiva MD

  *  Referring Physician: Anastasia Yang . CRNP

  *  Performed By: Sara Rosario RDCS

  *  Accession# [id number]  Account# [id number]

  *  Reason For Study: SYNCOPE

  *  BSA: 1.9 m2

  *  -- Conclusions --

  *  1. Normal LV size.  Borderline concentric LVH.

  *  2. Normal LV systolic function.  LVEF 65-70 %. No regional wall motion abnormalities.

  *  3. RV not well visualized but appears borderline dilated with grossly normal function.

  *  4. Moderate eccentric mitral regurgitation.

  *  5. Suspected dynamic LV outflow tract obstruction (peak gradient 26 mmHg, DEDE not appreciated)

  *  5. Normal IVC and estimated RA pressure.

  *  6. Compared with prior study on 07/15/2015:  No regional wall motion abnormalities noted. DEDE 
not appreciated.

Procedure Details

  *  A complete two-dimensional transthoracic echocardiogram was performed (2D, M-mode, Doppler and 
color flow Doppler).

  *  The study was technically difficult.

  *  There were technical limitations due to patient'spoor positioning

  *  A contrast injection of Definity was performed to improve assessment of LV function.

  *  Contrast was injected into an intravenous site in the right arm.

  *  One vial of Definity ultrasound contrast was diluted in normal saline to a total volume of 10 
ml.  A total of '3' ml of solution was administered during imaging.

  *  Lot # [id number] of Definity utilized for procedure.

  *  Expiration date 4/19.

  *  The attending nurse who injected the contrast agent was ABHAY BUCK RN.

Left Ventricle

  *  The left ventricle is grossly normal size.

  *  There is borderline concentric left ventricular hypertrophy.

  *  Ejection Fraction = 65-70%.

  *  Suspected LVOT dynamic obstruction (peak gradient 26 mmHg)

  *  No regional wall motion abnormalities noted.

Right Ventricle

  *  The right ventricle is not well visualized.

  *  The right ventricle is borderline dilated.

  *  The right ventricular systolic function is qualitatively normal.

Atria

  *  The left atrium is mildly dilated.

  *  The right atrium is mildly dilated.

  *  No ASD detected; PFO is not assessed.

Mitral Valve

  *  The mitral valve is grossly normal.

  *  The mitral valve is not well visualized.

  *  There is no mitral valve stenosis.

  *  There is moderate mitral regurgitation.

  *  The mitral regurgitant jet is eccentrically directed.

Tricuspid Valve

  *  The tricuspid valve is not well visualized, but is grossly normal.

  *  There is trace tricuspid regurgitation.

Aortic Valve

  *  The aortic valve is trileaflet.

  *  Aortic valve sclerosis mild, without significant aortic valvular stenosis.

  *  No hemodynamically significant valvular aortic stenosis.

  *  There is no significant aortic regurgitation.

Pulmonic Valve

  *  The pulmonary valve is inadequately visualized, but the Doppler data is adequate for 
interpretation.

  *  Pulmonic stenosis is absent.

  *  There is no significant pulmonary regurgitation.

Great Vessels

  *  The aortic root and proximal ascending aorta are normal sized.

Pericardium/Pleural

  *  There is no pericardial effusion.

Great Vessels

  *  Normal inferior vena cava size and collapsability with sniff indicates a normal right atrial 
pressure of 3 mmHg



MMode 2D Measurements and Calculations

IVSd 0.78 cm

IVSs 1.1 cm



LVIDd 5.4 cm

LVIDs 2.5 cm

LVPWd 0.99 cm

LVPWs 1.9 cm



IVS/LVPW 0.79 

FS 54.6 %

EDV(Teich) 144.3 ml

ESV(Teich) 21.7 ml

EF(Teich) 84.9 %



EDV(cubed) 161.7 ml

ESV(cubed) 15.1 ml

EF(cubed) 90.7 %

% IVS thick 37.1 %

% LVPW thick 93.1 %





LV mass(C)d 178.9 grams

LV mass(C)dI 95.0 grams/m\S\2

LV mass(C)s 123.0 grams

LV mass(C)sI 65.3 grams/m\S\2



SV(Teich) 122.5 ml

SI(Teich) 65.1 ml/m\S\2

SV(cubed) 146.6 ml

SI(cubed) 77.9 ml/m\S\2



ACS 1.4 cm



asc Aorta Diam 2.9 cm





LVOT diam 1.9 cm

LVOT area 2.9 cm\S\2



LVAd ap4 24.3 cm\S\2

LVLd ap4 6.8 cm

EDV(MOD-sp4) 70.5 ml

EDV(sp4-el) 73.3 ml

LVAs ap4 9.0 cm\S\2

LVLs ap4 5.9 cm

ESV(MOD-sp4) 11.5 ml

ESV(sp4-el) 11.8 ml

EF(MOD-sp4) 83.6 %

EF(sp4-el) 84.0 %



LVAd ap2 33.9 cm\S\2

LVLd ap2 8.0 cm

EDV(MOD-sp2) 118.2 ml

EDV(sp2-el) 121.9 ml

LVAs ap2 11.2 cm\S\2

LVLs ap2 6.1 cm

ESV(MOD-sp2) 16.8 ml

ESV(sp2-el) 17.2 ml

EF(MOD-sp2) 85.8 %

EF(sp2-el) 85.9 %



LVLd %diff 14.8 %

EDV(MOD-bp) 100.2 ml

LVLs %diff 4.4 %

ESV(MOD-bp) 13.9 ml

EF(MOD-bp) 86.2 %





SV(MOD-sp4) 58.9 ml

SI(MOD-sp4) 31.3 ml/m\S\2



SV(MOD-sp2) 101.5 ml

SI(MOD-sp2) 53.9 ml/m\S\2



SV(MOD-bp) 86.3 ml

SI(MOD-bp) 45.8 ml/m\S\2



SV(sp4-el) 61.6 ml

SI(sp4-el) 32.7 ml/m\S\2





SV(sp2-el) 104.7 ml

SI(sp2-el) 55.6 ml/m\S\2













Doppler Measurements and Calculations

MV E max darien 141.3 cm/sec



MV dec time 0.20 sec



Ao V2 max 263.2 cm/sec

Ao max PG 27.7 mmHg

Ao max PG (full) 24.9 mmHg

EDIN(V,A) 0.92 cm\S\2

EDIN(V,D) 0.92 cm\S\2



LV V1 max PG 2.8 mmHg





LV V1 max 83.3 cm/sec



MR max darien 526.1 cm/sec

MR max .7 mmHg



PA V2 max 67.4 cm/sec

PA max PG 1.8 mmHg

## 2018-04-26 NOTE — CARDIOLOGY PROGRESS NOTE
DATE: 04/26/2018

 

TIME:  8:35 a.m.

 

SUBJECTIVE:  She felt more short of breath and anxious.  She had more pain

overnight with her tailbone and back.  She states that with minimal exertion,

she is feeling more short of breath.  Her oxygen has been increased to 4 L

per nasal cannula.  She denies chest pain, palpitations, syncope, or

bleeding.

 

OBJECTIVE:

VITAL SIGNS:  Temperature 36.4 degrees, heart rate 98 beats per minute, but

overall her heart rate has been improved, respiration rate 20, blood pressure

155/88 mmHg, oxygen saturation 96% on 4 L per nasal cannula.  Weight 72.7 kg.

GENERAL:  In no acute distress.  She is alert.

NECK:  No JVD.

CARDIAC EXAMINATION:  No ventricular heave, irregularly irregular, normal S1,

S2.  2/6 systolic murmur.  No rubs or gallops.

LUNGS:  Decreased breath sounds throughout with some expiratory wheezing.

ABDOMEN:  Soft, nontender, nondistended.  Normoactive bowel sounds.

EXTREMITIES:  No cyanosis or edema.

PSYCHIATRIC:  Affect appears appropriate.

 

MEDICATIONS:  Include aspirin 81 mg daily, Lipitor 40 mg at bedtime,

azithromycin, diltiazem 120 mg daily, metoprolol succinate 50 mg b.i.d.,

prednisone 60 mg daily, Xarelto 20 mg daily.

 

DATA:  Telemetry personally reviewed.  Atrial fibrillation.

 

LABORATORIES:  White blood cell count is 6.95, hemoglobin 11.9, platelets

204.  Sodium 135, potassium 3.9, BUN 16, creatinine 0.85.

 

ASSESSMENT AND PLAN:

1.  Atrial fibrillation/flutter:  She appears to have persistent atrial

fibrillation/flutter.  Continue beta blocker at current dose.  If there is a

concern that beta blockers worsening her lung disease, could then increase

diltiazem and decrease metoprolol.  She is currently on her home dose of beta

blocker.  Continue diltiazem and beta blocker at current doses for now. 

Continue anticoagulation for stroke risk reduction if no contraindications.

2.  Shortness of breath:  Likely COPD exacerbation.  She is being treated by

primary service.  She appears euvolemic.

3.  Alcohol abuse:  She now states that her last drink was Sunday rather than

last Friday, which would be approximately 4 days ago.  She is feeling more

anxious.  This could be due to alcohol withdrawal.  She is also having more

difficulty breathing which could be from her COPD exacerbation.  We will

defer to primary team.

4.  Tobacco abuse:  Recommend that she stop smoking.

5.  Mitral regurgitation:  It has been nonsevere in the past.  A repeat echo

is pending.

6.  Syncope:  The case has been discussed with electrophysiology, Dr. Kocher.

 Loop recorder is being planned for to further evaluate for arrhythmia as

cause of her syncope.  Dr. Kocher will arrange the loop recorder.

7.  History of endocarditis:  Follow blood cultures.  There are no

significant signs or symptoms to suggest active endocarditis or bacteremia at

this time.

8.  Disposition:  Loop recorder is being planned with Dr. Kocher  I will be

away from the hospital for the next 3 days.  If there are any questions or

concerns from a cardiac perspective, please do not hesitate to contact the

on-call cardiologist for [facility].

 

The patient's care has been communicated with Dr. Perdue, the primary

hospitalist service.

## 2018-04-26 NOTE — ORTHOPEDIC CONSULTATION REPORT
DATE OF CONSULTATION:  04/26/2018

 

CHIEF COMPLAINT:  Sacral pain.

 

SECONDARY COMPLAINT:  Right elbow pain.

 

HISTORY OF PRESENT ILLNESS:  This 62-year-old white female is seen in room

209 for an orthopedic consultation.  The patient was involved in a fall at a

bus station 5 days ago.  She landed on her buttocks.  She states she landed

on concrete.  She was seen in the ER on April 24.  Cause of the fall was

dizziness and loss of consciousness.  She was admitted for persistent

irregular heartbeat and shortness of breath.  Her tailbone pain continues. 

She denies any numbness or tingling in her lower extremities.  She denies any

loss of function in her legs.  She does have a history of right total knee

arthroplasty and subsequent revision for which she follows at her office. 

Her worst pain is when arising from the bed or chair.  She states she is fine

when she is up and walking.  No difficulty with bowel or bladder function.

 

She also complains of right elbow pain since her fall.  She points to the

lateral aspect.  She denies any loss of motion.  No numbness or tingling. 

Pain is worse with motion.  No prior history of significant elbow injury. 

She is currently on Xarelto and cannot take anti-inflammatories.

 

PAST MEDICAL HISTORY:  Significant for osteoarthritis, history of bronchitis,

anxiety, asthma, bipolar disorder, depression, emphysema, hypertension,

elevated lipids, history of non-STEMI, history of pneumonia, history of acute

and chronic respiratory failure, syncope, history of traumatic

rhabdomyolysis.

 

PREVIOUS SURGERIES:  Total knee arthroplasty, total knee revision,

hysterectomy.

 

FAMILY HISTORY:  Significant for diabetes, hypertension, heart disease, and

lung disease.

 

SOCIAL HISTORY:  The patient is a smoker.  Daily ETOH use.  .  Lives

by herself.  Employed.

 

CURRENT MEDICATIONS:  Amlodipine, amoxicillin, aspirin, atorvastatin,

docusate sodium, iron, HCTZ/losartan, lamotrigine, Keppra, meclizine,

Toprol-XL, Xarelto, and venlafaxine HCL.  She also takes Tylenol and

furosemide as needed.

 

ALLERGIES:  KNOWN ALLERGY TO METRONIDAZOLE.

 

REVIEW OF SYSTEMS:  Significant for above stated conditions, otherwise

unremarkable.

 

PHYSICAL EXAMINATION:

VITAL SIGNS:  Temperature 36.4, pulse 98, /88, respirations 20, O2 sat

95% on 4 liters by nasal cannula.

GENERAL:  Well-developed, well-nourished middle-aged white female, in no

acute distress.  Obvious discomfort.  Lying on a bed.  Alert and oriented. 

Looks older than her stated age.

SKIN:  Warm and dry with good turgor.  No rashes or lesions.  No significant

edema.  Small area of ecchymosis in her right antecubital fossa from her IV. 

No ecchymosis laterally.

MUSCULOSKELETAL:  Right elbow has no obvious asymmetry or deformity.  Full

flexion and extension.  Full supination and pronation.  She has focal

discomfort with palpation over the lateral epicondyle and the extensor bundle

origin.  No pain with palpation over the rest of the forearm.  No pain with

palpation over the radial head, olecranon, or medial condyle.  No pain with

stressing of the ends of the ulnar collateral ligament or radial collateral

ligament.  Strength is 5/5 for resisted flexion and extension of the elbow

and wrist.  She does get some increase in pain with lateral epicondylitis

provocation.  Low back and sacral evaluation reveals no pain with palpation

over the lumbar spine, vertebral bodies or disc spaces.  She has mild

discomfort with palpation over the central portion of the sacrum.  Her worst

discomfort is over the right SI joint and extending into the right buttock. 

No pain with palpation on the left SI joint or left buttock.  Supple motion

of the right hip.  She is ambulatory without an assistive device.  Good

motion for her right knee and ankle.  Strength is 5/5 for resisted

dorsiflexion of the great toe, and ankle.  Strength is also 5/5 for resisted

plantarflexion of the great toe and ankle.

NEUROLOGIC:  Gross sensation is intact across both lower extremities, across

all dermatomes, by soft touch.  Peripheral pulses are 2+.  DTRs are 1+ on the

right and 2+ on the left.  This is baseline from her previous office

examinations given her total knee arthroplasty.

 

DATA:  Radiographic imaging previously obtained shows no fracture within the

sacrum or coccyx.  CT scan imaging previously obtained was reviewed.  She has

a transverse fracture through the S4 area with 3 mm of anterior displacement.

 

IMPRESSION:

1.  Right elbow pain.

2.  Sacral fracture.

 

PLAN:  The patient was educated regarding today's findings.  X-ray of her

right elbow will be obtained.  She will likely benefit from physical therapy

for this.  In regard to the sacral fracture, there is no surgical

intervention required at this point.  She may participate in physical therapy

to pain tolerance.  She may follow up in the office upon discharge. 

Weightbearing as tolerated.  Continue pain medication as needed for comfort. 

She may use Tylenol, and severe pain, narcotic medication.  I did warn her

about the potential for constipation with narcotics.  She may require a

regular bowel regimen.  We will continue to follow her while she is admitted.

 Please call the office at [phone] for any further questions and schedule 
follow-up with Dr. Randall or his PA Pradeep.

 

 I, Dr. Duarte, saw and examined the patient and agree with the above findings 
and plan of care. Elbow x-rays were reviewed and show no evidence of acute 
fracture or dislocation. 

 

[facility]

## 2018-04-26 NOTE — DIAGNOSTIC IMAGING REPORT
RIGHT ELBOW 3 VIEWS



CLINICAL HISTORY: Fall with elbow pain.



FINDINGS: 3 views of the right elbow are obtained. No prior studies are

available for comparison at the time of dictation. The skeletal structures are

osteopenic. There is no radiographic evidence of fracture. The joint spaces

appear maintained. There is no joint effusion. A large enthesophyte arises from

the lateral humeral epicondyle. Degenerative spurring is seen along the medial

aspect of the joint space. The overlying soft tissues are within normal limits.



IMPRESSION: Osteopenia and mild degenerative change as above. There is no

radiographic evidence of right elbow fracture.







Electronically signed by:  Ruel Capellan M.D.

4/26/2018 1:36 PM



Dictated Date/Time:  4/26/2018 1:34 PM

## 2018-04-27 VITALS
SYSTOLIC BLOOD PRESSURE: 156 MMHG | TEMPERATURE: 97.7 F | DIASTOLIC BLOOD PRESSURE: 99 MMHG | HEART RATE: 117 BPM | OXYGEN SATURATION: 95 %

## 2018-04-27 VITALS
TEMPERATURE: 97.52 F | DIASTOLIC BLOOD PRESSURE: 67 MMHG | SYSTOLIC BLOOD PRESSURE: 134 MMHG | OXYGEN SATURATION: 95 % | HEART RATE: 101 BPM

## 2018-04-27 VITALS — OXYGEN SATURATION: 95 %

## 2018-04-27 VITALS — HEART RATE: 89 BPM | OXYGEN SATURATION: 95 %

## 2018-04-27 VITALS — DIASTOLIC BLOOD PRESSURE: 83 MMHG | SYSTOLIC BLOOD PRESSURE: 145 MMHG

## 2018-04-27 VITALS
SYSTOLIC BLOOD PRESSURE: 112 MMHG | DIASTOLIC BLOOD PRESSURE: 68 MMHG | HEART RATE: 84 BPM | TEMPERATURE: 97.7 F | OXYGEN SATURATION: 97 %

## 2018-04-27 VITALS
OXYGEN SATURATION: 97 % | SYSTOLIC BLOOD PRESSURE: 178 MMHG | HEART RATE: 106 BPM | TEMPERATURE: 98.6 F | DIASTOLIC BLOOD PRESSURE: 95 MMHG

## 2018-04-27 VITALS
OXYGEN SATURATION: 96 % | HEART RATE: 94 BPM | TEMPERATURE: 98.6 F | SYSTOLIC BLOOD PRESSURE: 176 MMHG | DIASTOLIC BLOOD PRESSURE: 121 MMHG

## 2018-04-27 VITALS — OXYGEN SATURATION: 99 %

## 2018-04-27 VITALS
TEMPERATURE: 98.6 F | HEART RATE: 106 BPM | SYSTOLIC BLOOD PRESSURE: 178 MMHG | DIASTOLIC BLOOD PRESSURE: 95 MMHG | OXYGEN SATURATION: 97 %

## 2018-04-27 VITALS
SYSTOLIC BLOOD PRESSURE: 153 MMHG | OXYGEN SATURATION: 92 % | TEMPERATURE: 97.7 F | HEART RATE: 101 BPM | DIASTOLIC BLOOD PRESSURE: 107 MMHG

## 2018-04-27 VITALS — OXYGEN SATURATION: 98 % | HEART RATE: 92 BPM

## 2018-04-27 VITALS — DIASTOLIC BLOOD PRESSURE: 91 MMHG | HEART RATE: 103 BPM | SYSTOLIC BLOOD PRESSURE: 151 MMHG

## 2018-04-27 VITALS — OXYGEN SATURATION: 97 %

## 2018-04-27 VITALS — HEART RATE: 85 BPM | OXYGEN SATURATION: 95 %

## 2018-04-27 VITALS — HEART RATE: 104 BPM

## 2018-04-27 VITALS — HEART RATE: 88 BPM | OXYGEN SATURATION: 97 %

## 2018-04-27 LAB
BUN SERPL-MCNC: 19 MG/DL (ref 7–18)
CALCIUM SERPL-MCNC: 9.3 MG/DL (ref 8.5–10.1)
CO2 SERPL-SCNC: 25 MMOL/L (ref 21–32)
CREAT SERPL-MCNC: 0.91 MG/DL (ref 0.6–1.2)
EOSINOPHIL NFR BLD AUTO: 205 K/UL (ref 130–400)
GLUCOSE SERPL-MCNC: 141 MG/DL (ref 70–99)
HCT VFR BLD CALC: 33.7 % (ref 37–47)
HGB BLD-MCNC: 11.5 G/DL (ref 12–16)
MCH RBC QN AUTO: 34 PG (ref 25–34)
MCHC RBC AUTO-ENTMCNC: 34.1 G/DL (ref 32–36)
MCV RBC AUTO: 99.7 FL (ref 80–100)
PMV BLD AUTO: 9.1 FL (ref 7.4–10.4)
POTASSIUM SERPL-SCNC: 4 MMOL/L (ref 3.5–5.1)
RED CELL DISTRIBUTION WIDTH CV: 14.8 % (ref 11.5–14.5)
RED CELL DISTRIBUTION WIDTH SD: 53.4 FL (ref 36.4–46.3)
SODIUM SERPL-SCNC: 135 MMOL/L (ref 136–145)
WBC # BLD AUTO: 14.64 K/UL (ref 4.8–10.8)

## 2018-04-27 RX ADMIN — LEVALBUTEROL SCH MG: 1.25 SOLUTION, CONCENTRATE RESPIRATORY (INHALATION) at 14:27

## 2018-04-27 RX ADMIN — LEVALBUTEROL PRN MG: 1.25 SOLUTION, CONCENTRATE RESPIRATORY (INHALATION) at 22:02

## 2018-04-27 RX ADMIN — GUAIFENESIN SCH MG: 600 TABLET, EXTENDED RELEASE ORAL at 08:42

## 2018-04-27 RX ADMIN — METHYLPREDNISOLONE SODIUM SUCCINATE SCH MLS/MIN: 1 INJECTION, POWDER, FOR SOLUTION INTRAMUSCULAR; INTRAVENOUS at 08:42

## 2018-04-27 RX ADMIN — FERROUS GLUCONATE SCH MG: 324 TABLET ORAL at 10:39

## 2018-04-27 RX ADMIN — RIVAROXABAN SCH MG: 20 TABLET, FILM COATED ORAL at 16:03

## 2018-04-27 RX ADMIN — Medication SCH MG: at 08:43

## 2018-04-27 RX ADMIN — AZITHROMYCIN SCH MG: 250 TABLET, FILM COATED ORAL at 08:43

## 2018-04-27 RX ADMIN — METHYLPREDNISOLONE SODIUM SUCCINATE SCH MLS/MIN: 1 INJECTION, POWDER, FOR SOLUTION INTRAMUSCULAR; INTRAVENOUS at 20:46

## 2018-04-27 RX ADMIN — LIDOCAINE SCH PATCH: 50 PATCH CUTANEOUS at 08:43

## 2018-04-27 RX ADMIN — LEVALBUTEROL SCH MG: 1.25 SOLUTION, CONCENTRATE RESPIRATORY (INHALATION) at 18:49

## 2018-04-27 RX ADMIN — IPRATROPIUM BROMIDE SCH MG: 0.5 SOLUTION RESPIRATORY (INHALATION) at 02:21

## 2018-04-27 RX ADMIN — METOPROLOL SUCCINATE SCH MG: 25 TABLET, EXTENDED RELEASE ORAL at 10:38

## 2018-04-27 RX ADMIN — DILTIAZEM HYDROCHLORIDE SCH MG: 120 CAPSULE, COATED, EXTENDED RELEASE ORAL at 10:39

## 2018-04-27 RX ADMIN — FERROUS GLUCONATE SCH MG: 324 TABLET ORAL at 16:03

## 2018-04-27 RX ADMIN — ATORVASTATIN CALCIUM SCH MG: 40 TABLET, FILM COATED ORAL at 20:48

## 2018-04-27 RX ADMIN — IPRATROPIUM BROMIDE SCH MG: 0.5 SOLUTION RESPIRATORY (INHALATION) at 14:28

## 2018-04-27 RX ADMIN — VENLAFAXINE HYDROCHLORIDE SCH MG: 75 CAPSULE, EXTENDED RELEASE ORAL at 08:42

## 2018-04-27 RX ADMIN — IPRATROPIUM BROMIDE SCH MG: 0.5 SOLUTION RESPIRATORY (INHALATION) at 06:16

## 2018-04-27 RX ADMIN — LEVALBUTEROL SCH MG: 1.25 SOLUTION, CONCENTRATE RESPIRATORY (INHALATION) at 06:16

## 2018-04-27 RX ADMIN — IPRATROPIUM BROMIDE PRN MG: 0.5 SOLUTION RESPIRATORY (INHALATION) at 22:02

## 2018-04-27 RX ADMIN — DOCUSATE SODIUM SCH MG: 100 CAPSULE, LIQUID FILLED ORAL at 20:47

## 2018-04-27 RX ADMIN — GUAIFENESIN SCH MG: 600 TABLET, EXTENDED RELEASE ORAL at 20:48

## 2018-04-27 RX ADMIN — METOPROLOL SUCCINATE SCH MG: 25 TABLET, EXTENDED RELEASE ORAL at 20:47

## 2018-04-27 RX ADMIN — IPRATROPIUM BROMIDE SCH MG: 0.5 SOLUTION RESPIRATORY (INHALATION) at 18:49

## 2018-04-27 RX ADMIN — DOCUSATE SODIUM SCH MG: 100 CAPSULE, LIQUID FILLED ORAL at 08:42

## 2018-04-27 RX ADMIN — Medication SCH EA: at 20:46

## 2018-04-27 RX ADMIN — Medication SCH MG: at 08:42

## 2018-04-27 RX ADMIN — NICOTINE SCH PATCH: 7 PATCH, EXTENDED RELEASE TRANSDERMAL at 08:43

## 2018-04-27 RX ADMIN — LEVALBUTEROL SCH MG: 1.25 SOLUTION, CONCENTRATE RESPIRATORY (INHALATION) at 02:21

## 2018-04-27 RX ADMIN — FERROUS GLUCONATE SCH MG: 324 TABLET ORAL at 08:41

## 2018-04-27 NOTE — FAMILY MEDICINE PROGRESS NOTE
Progress Note


Date of Service


Apr 27, 2018.





Subjective


Pt evaluation today including:  conversation w/ patient, physical exam, chart 

review, lab review, conversation w/ consultant


Pain:  Mild low back pain, tolerable


Voiding:  no voiding problems, no incontinence





   Constitutional:  No fever, No chills, No sweats


   Eyes:  No worsening of vision, No redness, No discharge


   ENT:  No hearing loss, No nasal symptoms, No sore throat, No tinnitus


   Respiratory:  + sputum, + wheezing, + shortness of breath


   Cardiovascular:  No chest pain, No orthopnea, No palpitations


   Abdomen:  No pain, No nausea, No vomiting, No diarrhea, No constipation


   Musculoskeletal:  No joint pain, No muscle pain, No swelling


   Female :  No dysuria, No urinary frequency


   Neurologic:  No weakness, No numbness/tingling, No vertigo


   Heme:  No clotting problems, No swollen lymph nodes


   Endo:  No fatigue


   Skin:  No rash, No itch, No new/changing skin lesions, No color change





Medications





Current Inpatient Medications








 Medications


  (Trade)  Dose


 Ordered  Sig/Kate


 Route  Start Time


 Stop Time Status Last Admin


Dose Admin


 


 Aspirin


  (Ecotrin Tab)  81 mg  QAM


 PO  4/25/18 09:00


 5/25/18 08:59  4/27/18 08:42


81 MG


 


 Atorvastatin


 Calcium


  (Lipitor Tab)  40 mg  HS


 PO  4/24/18 21:00


 5/24/18 20:59  4/26/18 20:57


40 MG


 


 Ferrous Gluconate


  (Ferrous


 Gluconate Tab)  324 mg  TIDM


 PO  4/25/18 07:30


 5/25/18 07:59  4/26/18 16:46


324 MG


 


 Rivaroxaban


  (Xarelto Tab)  20 mg  QDD


 PO  4/25/18 16:45


 5/25/18 16:44  4/26/18 16:46


20 MG


 


 Venlafaxine HCl


  (effeXOR


 EXTENDED REL CAP)  75 mg  QAM


 PO  4/25/18 09:00


 5/25/18 08:59  4/27/18 08:42


75 MG


 


 Guaifenesin


  (Mucinex Contr


 Rel Tab)  600 mg  Q12


 PO  4/24/18 21:00


 5/24/18 20:59  4/27/18 08:42


600 MG


 


 Azithromycin


  (Zithromax Tab)  250 mg  QAM


 PO  4/25/18 09:00


 4/29/18 08:59  4/27/18 08:43


250 MG


 


 Ipratropium


 Bromide


  (Atrovent 0.02%


 0.5MG/2.5ML Neb)  0.5 mg  Q6R


 INH  4/25/18 03:00


 5/25/18 02:59  4/27/18 14:28


0.5 MG


 


 Levalbuterol


  (Xopenex 1.25MG/


 0.5ML Neb)  1.25 mg  Q6R


 INH  4/25/18 03:00


 5/25/18 02:59  4/27/18 14:27


1.25 MG


 


 Ipratropium


 Bromide


  (Atrovent 0.02%


 0.5MG/2.5ML Neb)  0.5 mg  Q2H  PRN


 INH  4/24/18 21:30


 5/24/18 21:29  4/26/18 12:10


0.5 MG


 


 Levalbuterol


  (Xopenex 1.25MG/


 0.5ML Neb)  1.25 mg  Q2H  PRN


 INH  4/24/18 21:30


 5/24/18 21:29  4/26/18 12:10


1.25 MG


 


 Lidocaine


  (Lidoderm Patch


 5%)  1 patch  QAM


 TD  4/26/18 09:00


 5/26/18 08:59  4/27/18 08:43


1 PATCH


 


 Miscellaneous


  (Remove Lidoderm


 Patch)  1 ea  DAILY@21


 N/A  4/25/18 21:00


 5/25/18 20:59  4/26/18 20:58


1 EA


 


 Nicotine


  (Nicoderm Cq 7


 Mg Patch)  1 patch  QAM


 TD  4/25/18 12:00


 5/25/18 11:59  4/27/18 08:43


1 PATCH


 


 Miscellaneous


  (Remove Nicoderm


 Patch)  1 ea  HS


 N/A  4/25/18 21:00


 5/25/18 20:59  4/26/18 20:58


1 EA


 


 Morphine Sulfate


  (MoRPHine


 SULFATE INJ)  1 mg  Q6H  PRN


 IV  4/25/18 18:00


 5/9/18 17:59  4/26/18 16:19


1 MG


 


 Thiamine HCl


  (Vitamin B-1 Tab)  100 mg  DAILY


 PO  4/26/18 02:00


 4/29/18 09:01  4/27/18 08:43


100 MG


 


 Methylprednisolone


 Sodium Succinate


 40 mg/Syringe  0.64 ml @ 


 1.5 mls/min  BID


 IV  4/26/18 21:00


 5/26/18 20:59  4/27/18 08:42


1.5 MLS/MIN


 


 Lorazepam


  (Ativan Inj)  PRN Dosing


 -Active


 Protocol  Q1H  PRN


 IV  4/26/18 10:15


 5/26/18 10:14  4/26/18 11:10


1 MG


 


 Docusate Sodium


  (coLACE CAP)  100 mg  BID


 PO  4/26/18 21:00


 5/26/18 20:59  4/27/18 08:42


100 MG


 


 Multivitamins


  (Multivitamin


 Tab)  1 tab  QAM


 PO  4/27/18 09:00


 5/27/18 08:59  4/26/18 11:07


1 TAB


 


 Folic Acid


  (Folvite Tab)  1 mg  QAM


 PO  4/27/18 09:00


 5/27/18 08:59  4/27/18 08:42


1 MG


 


 Diltiazem HCl


  (Dilacor Xr Cap)  240 mg  QAM


 PO  4/27/18 09:00


 5/26/18 08:59  4/27/18 10:39


240 MG


 


 Metoprolol


 Succinate


  (Toprol Xl Tab)  25 mg  BID


 PO  4/27/18 09:00


 5/25/18 08:59  4/27/18 10:38


25 MG











Objective


Vital Signs











  Date Time  Temp Pulse Resp B/P (MAP) Pulse Ox O2 Delivery O2 Flow Rate FiO2


 


4/27/18 14:28  88 20  97 Nasal Cannula 4.0 


 


4/27/18 13:57    145/83 (103)    


 


4/27/18 12:05     95 Nasal Cannula 4.0 


 


4/27/18 11:37 37.0 94 20 169/121 (137) 96 Nasal Cannula 4.0 





    176/110 (132)    


 


4/27/18 08:01     95 Nasal Cannula 4.0 


 


4/27/18 07:45 36.5 117 20 156/99 (118) 95 Nasal Cannula 4.0 


 


4/27/18 06:16  92 20  98 Nasal Cannula 4.0 


 


4/27/18 04:34  103  151/91 (111)    


 


4/27/18 04:05 37.0 106 18 178/95 (122) 97 Nasal Cannula 2.0 


 


4/27/18 00:01     99 Nasal Cannula 4.0 





      Humidified Air  


 


4/27/18 00:00  104      


 


4/26/18 23:36 36.8 121 19 151/99 (116) 99 Nasal Cannula 4.0 


 


4/26/18 20:00      Nasal Cannula 4.0 


 


4/26/18 19:51 36.8 108 22 156/119 (131) 97  4.0 100


 


4/26/18 19:17  93 20  97 Nasal Cannula 4.0 


 


4/26/18 16:00      Nasal Cannula 4.0 


 


4/26/18 15:43 36.7 102 22 133/101 (112) 93 Nasal Cannula 4.0 100











Physical Exam


General Appearance:  WD/WN, no apparent distress


Eyes:  normal inspection, EOMI


ENT:  hearing grossly normal, pharynx normal


Neck:  supple, no adenopathy, no JVD


Respiratory/Chest:  + pertinent finding (coarse breath sounds with end-

expiratory wheezing)


Cardiovascular:  no edema, no JVD, + systolic murmur (MR murmur radiating into 

axilla), + irregularly irregular


Abdomen:  normal bowel sounds, non tender, soft


Extremities:  non-tender, no pedal edema


Neurologic/Psychiatric:  alert, normal mood/affect, oriented x 3


Skin:  normal color, warm/dry, no rash


Lymphatic:  no adenopathy





Laboratory Results





Last 24 Hours








Test


  4/27/18


05:58


 


White Blood Count 14.64 K/uL 


 


Red Blood Count 3.38 M/uL 


 


Hemoglobin 11.5 g/dL 


 


Hematocrit 33.7 % 


 


Mean Corpuscular Volume 99.7 fL 


 


Mean Corpuscular Hemoglobin 34.0 pg 


 


Mean Corpuscular Hemoglobin


Concent 34.1 g/dl 


 


 


RDW Standard Deviation 53.4 fL 


 


RDW Coefficient of Variation 14.8 % 


 


Platelet Count 205 K/uL 


 


Mean Platelet Volume 9.1 fL 


 


Sodium Level 135 mmol/L 


 


Potassium Level 4.0 mmol/L 


 


Chloride Level 103 mmol/L 


 


Carbon Dioxide Level 25 mmol/L 


 


Anion Gap 7.0 mmol/L 


 


Blood Urea Nitrogen 19 mg/dl 


 


Creatinine 0.91 mg/dl 


 


Est Creatinine Clear Calc


Drug Dose 68.4 ml/min 


 


 


Estimated GFR (


American) 77.8 


 


 


Estimated GFR (Non-


American 67.1 


 


 


BUN/Creatinine Ratio 20.7 


 


Random Glucose 141 mg/dl 


 


Calcium Level 9.3 mg/dl 











Assessment and Plan


62 year old female admitted for syncopal episodes resulting in fall and sacral 

fracture.  Also admitted for SOB due to COPD exacerbation.





Our plan for her is as follows:





COPD exacerbation with acute hypoxic respiratory failure


- Most recent PFTs, FEV1/FVC 65


- O2 goal sat 92%


- Nebulizers PRN


- Continue IV solumedrol 40 mg BID


- Continue Azithromycin 5 day course





Atrial flutter/fibrillation


- Recs by cardiology appreciated


- To offset risk of bronchospasm; will de-escalate Metoprolol today


- Will increase Diltazem to 240 mg daily today


- On Xarelto


- Goal HR < 100





Repeated syncopal episodes


- Loop recorder in situ


- Cardiology recs appreciated





Alcohol abuse


- On active withdrawal protocol with Lorazepam


- Has demonstrated willingness to participate in patient rehabilitation


- Continue multivitamin, folate and thiamine supplementation





Hx fall with sacral fracture


- Ortho recommendations appreciated; non-operative/conservative approach


- Pain management: acetaminophen, heat pack, lidocaine patch.


- Avoid NSAIDs due to Xarelto use


- PT/OT ordered





History of Infective Endocarditis


- No evidence of intracardiac infection





Anxiety, Bipolar, depression


- Continue venlafaxine and Lamictal





Hypertension


- Continue metoprolol. Diltiazem added, as above.


- Can resume Losartan due to resolved ROLAND





Acute Kidney Injury


- Resolved





Tobacco abuse 


- Encourage cessation


- Nicotine patch ordered





VTE Prophylaxis


- Xarelto


- SCD/KINJAL





Code


- DNR 





Disposition


- Telemetry


- OT/PT


Continued [facility] stay due to:  abnormal vital signs


Discharge planning:  uncertain





Assessment/Plan


Resident Physician Supervision Note:





I was present with Dr. Marx during the history and exam. I discussed the case 

with the resident and agree with the findings and plan as documented in the 

note.  Any exceptions or clarifications are listed here: Gradual improvement in 

shortness of breath and cough which is now more loose/productive per patient. 

Afebrile. Would continue hgb monitoring and transitioning to dilt from 

metoprolol with goal HR of 80s. Isolated elevated blood pressure without 

similar history in the setting of etOH withdrawal and anxiety improved with PRN 

0.5mg lorazepam, would repeat sparsely if needed.

## 2018-04-27 NOTE — CARDIOLOGY FOLLOW-UP
Subjective


Date of Service:


Apr 27, 2018.


Pt evaluation today including:  conversation w/ patient, physical exam, chart 

review, lab review, review of studies, review of inpatient medication list, 

conversation w/ attending


History of Present Illness


This morning the patient is concerned about her breathing.  She states that her 

symptoms have not improved much since her admission.  She now has developed a 

cough as well.  This cough is essentially nonproductive.  She has no 

significant discomfort at her loop recorder implant site.  She has been 

ambulatory to the bathroom back with notable dyspnea.  No chest pain.  No 

dizziness.





Social History


Smoking Status:  Current Every Day Smoker





Review of Systems


Per HPI





Objective





Vital Signs Past 12 Hours








  Date Time  Temp Pulse Resp B/P (MAP) Pulse Ox O2 Delivery O2 Flow Rate FiO2


 


4/27/18 06:16  92 20  98 Nasal Cannula 4.0 


 


4/27/18 04:34  103  151/91 (111)    


 


4/27/18 04:05 37.0 106 18 178/95 (122) 97 Nasal Cannula 2.0 


 


4/27/18 00:01     99 Nasal Cannula 4.0 





      Humidified Air  


 


4/27/18 00:00  104      


 


4/26/18 23:36 36.8 121 19 151/99 (116) 99 Nasal Cannula 4.0 








Last Recorded Weight-Kilograms:  73.000


Physical Exam


She is alert and oriented x3.  Mood affect appear normal. She answered all 

questions appropriately.


HEENT:  Sclerae are anicteric.  Pupils are equal and reactive to light and 

accommodation.  Extraocular movements were intact.


Neuro:  Cranial nerves intact


Neck:  Examination of the submandibular region did not reveal any significant 

lymphadenopathy.  


Lungs:  She has reduced pulmonary excursion.  I do not appreciate a expiratory 

wheeze but she did have crackles in the bases bilaterally


Cardiac:  The rhythm was irregular.  S1 and S2 were normal.  Holosystolic 

murmur of variable intensity.  The PMI was not markedly displaced on palpation.


Abdomen:  The abdomen was soft and nontender.


Skin:  There are no rashes noted on examination today.  Mild ecchymosis at the 

site of her loop recorder implant





Data


Laboratory Results:





Last 24 Hours








Test


  4/26/18


11:00 4/27/18


05:58


 


Vitamin B12 Level 439 pg/mL  


 


White Blood Count  14.64 K/uL 


 


Red Blood Count  3.38 M/uL 


 


Hemoglobin  11.5 g/dL 


 


Hematocrit  33.7 % 


 


Mean Corpuscular Volume  99.7 fL 


 


Mean Corpuscular Hemoglobin  34.0 pg 


 


Mean Corpuscular Hemoglobin


Concent 


  34.1 g/dl 


 


 


RDW Standard Deviation  53.4 fL 


 


RDW Coefficient of Variation  14.8 % 


 


Platelet Count  205 K/uL 


 


Mean Platelet Volume  9.1 fL 


 


Sodium Level  135 mmol/L 


 


Potassium Level  4.0 mmol/L 


 


Chloride Level  103 mmol/L 


 


Carbon Dioxide Level  25 mmol/L 


 


Anion Gap  7.0 mmol/L 


 


Blood Urea Nitrogen  19 mg/dl 


 


Creatinine  0.91 mg/dl 


 


Est Creatinine Clear Calc


Drug Dose 


  68.4 ml/min 


 


 


Estimated GFR (


American) 


  77.8 


 


 


Estimated GFR (Non-


American 


  67.1 


 


 


BUN/Creatinine Ratio  20.7 


 


Random Glucose  141 mg/dl 


 


Calcium Level  9.3 mg/dl 














Telemetry reviewed:  Atrial fibrillation with slightly higher rates today





Assessment and Plan


1. Atrial fibrillation:  Persistent.  Patient is on Xarelto for 

anticoagulation.  Her overall rate seems to be climbing slightly.  The current 

intent is to reduce her metoprolol due to its potential contribution of 

bronchospasm.  We will increase her diltiazem as result.  Unclear if her higher 

heart rates are related to more pulmonary distress, alcohol withdrawal or 

changes in her volume status.  She will be continued on telemetry currently





2.  History of endocarditis:  Mitral valve endocarditis was diagnosed 2 years 

ago.  Valvular function appears to be stable with moderate mitral regurgitation.





3. Dyspnea:  Blood primarily to be related to COPD exacerbation.  As her 

symptoms are worsening in her exam may have changed repeat chest x-ray could be 

helpful.  One concern would be an element of volume overload given her 

hydration over the past few days.  Overall LV systolic function appears to be 

normal however on her echocardiogram.





4. Possible dynamic outflow tract obstruction:  She did appear to have a 

outflow tract gradient on echocardiogram.  I do not think this is contributing 

to any of her symptoms currently maintaining a good heart rate would be of 

value in this setting.  Some use of a beta-blocker as a negative inotrope would 

also be helpful





5. Syncope:  Unclear etiology.  Possibly related to alcohol abuse.  Possibly 

related to an arrhythmia.  Loop recorder was implanted yesterday to aid in 

diagnosis.

## 2018-04-27 NOTE — ORTHOPEDIC PROGRESS NOTE
Orthopedic Progress Note


Date of Service


Apr 27, 2018.





Subjective


Post OP Day:  Admission Day 3


Reports: feeling well (except for exacerbations of tachycardia and tachypnea), 

complaints (lateral Rt elbow pain and sacral pain), pain controlled w PO 

medications, Denies: chest pain, SOB, nausea / vomiting, light headedness, calf 

pain, using PCA





Objective


calves soft nontender, N/V intact, A&O x3, toes mobile, CMS intact


Right Elbow:  tender to palpation over lateral epicondyle.  Non-tender over med 

epicondyle, radial head, olecranon or distal humerus.  Full ROM with flex/ext, 

internal/external rotation.  Strength 5/5 resisting flex/extension.  Full ROM 

of wrist with no referred pain performing active ROM.  No edema, erythema, 

ecchymosis, warmth or palpable deformity.





Sacrum/Pelvis:  tender to palpation over sacrum worse on Rt side with assoc SI 

joint tenderness.  N/V intact in Rt LE.  No visible deformity, edema, erythema, 

ecchymosis.  Able to easily perform SLRT.  Able to dorsi/plantar flex against 

applied resistance without referred pain.  Strength 5/5/.  Knee ROM 0-130.











  Date Time  Temp Pulse Resp B/P (MAP) Pulse Ox O2 Delivery O2 Flow Rate FiO2


 


4/27/18 08:01     95 Nasal Cannula 4.0 


 


4/27/18 07:45 36.5 117 20 156/99 (118) 95 Nasal Cannula 4.0 


 


4/27/18 06:16  92 20  98 Nasal Cannula 4.0 


 


4/27/18 04:34  103  151/91 (111)    


 


4/27/18 04:05 37.0 106 18 178/95 (122) 97 Nasal Cannula 2.0 


 


4/27/18 00:01     99 Nasal Cannula 4.0 





      Humidified Air  


 


4/27/18 00:00  104      


 


4/26/18 23:36 36.8 121 19 151/99 (116) 99 Nasal Cannula 4.0 


 


4/26/18 20:00      Nasal Cannula 4.0 


 


4/26/18 19:51 36.8 108 22 156/119 (131) 97  4.0 100


 


4/26/18 19:17  93 20  97 Nasal Cannula 4.0 


 


4/26/18 16:00      Nasal Cannula 4.0 


 


4/26/18 15:43 36.7 102 22 133/101 (112) 93 Nasal Cannula 4.0 100


 


4/26/18 14:10  95 20  95 Nasal Cannula 4.0 


 


4/26/18 12:10  80 22  83 Room Air  


 


4/26/18 12:00     96 Nasal Cannula 4.0 





      Humidified Air  


 


4/26/18 11:30 36.7 96 26 156/88 (110) 96 Nasal Cannula 4.0 








Laboratory Results 24 Hours:











Test


  4/27/18


05:58


 


Hematocrit 33.7 % 


 


Hemoglobin 11.5 g/dL 











Assessment & Plan


Assessment:


1.  Right elbow pain


2.  Sacral fracture


Plan:


Cont oral pain meds for control


WBAT


Inpatient PT/OT


Medicine will monitor/treat Cardio/Pulm issues


Recommend f/u at our clinic after discharge.





Please recall if there are any orthopedic issues

## 2018-04-28 VITALS
SYSTOLIC BLOOD PRESSURE: 151 MMHG | TEMPERATURE: 97.88 F | DIASTOLIC BLOOD PRESSURE: 92 MMHG | HEART RATE: 87 BPM | OXYGEN SATURATION: 99 %

## 2018-04-28 VITALS — OXYGEN SATURATION: 96 % | HEART RATE: 86 BPM

## 2018-04-28 VITALS
TEMPERATURE: 97.88 F | SYSTOLIC BLOOD PRESSURE: 145 MMHG | HEART RATE: 85 BPM | OXYGEN SATURATION: 96 % | DIASTOLIC BLOOD PRESSURE: 89 MMHG

## 2018-04-28 VITALS
DIASTOLIC BLOOD PRESSURE: 94 MMHG | SYSTOLIC BLOOD PRESSURE: 154 MMHG | TEMPERATURE: 98.06 F | HEART RATE: 94 BPM | OXYGEN SATURATION: 96 %

## 2018-04-28 VITALS
TEMPERATURE: 98.6 F | OXYGEN SATURATION: 97 % | DIASTOLIC BLOOD PRESSURE: 87 MMHG | SYSTOLIC BLOOD PRESSURE: 157 MMHG | HEART RATE: 100 BPM

## 2018-04-28 VITALS
TEMPERATURE: 97.52 F | HEART RATE: 132 BPM | DIASTOLIC BLOOD PRESSURE: 112 MMHG | OXYGEN SATURATION: 95 % | SYSTOLIC BLOOD PRESSURE: 174 MMHG

## 2018-04-28 VITALS
SYSTOLIC BLOOD PRESSURE: 144 MMHG | DIASTOLIC BLOOD PRESSURE: 87 MMHG | TEMPERATURE: 97.16 F | HEART RATE: 83 BPM | OXYGEN SATURATION: 95 %

## 2018-04-28 VITALS
SYSTOLIC BLOOD PRESSURE: 154 MMHG | DIASTOLIC BLOOD PRESSURE: 94 MMHG | TEMPERATURE: 98.06 F | OXYGEN SATURATION: 96 % | HEART RATE: 94 BPM

## 2018-04-28 VITALS — OXYGEN SATURATION: 92 %

## 2018-04-28 VITALS — OXYGEN SATURATION: 98 % | HEART RATE: 101 BPM

## 2018-04-28 VITALS — HEART RATE: 116 BPM | OXYGEN SATURATION: 98 %

## 2018-04-28 VITALS — HEART RATE: 84 BPM | OXYGEN SATURATION: 95 %

## 2018-04-28 LAB
BUN SERPL-MCNC: 21 MG/DL (ref 7–18)
CALCIUM SERPL-MCNC: 9.1 MG/DL (ref 8.5–10.1)
CO2 SERPL-SCNC: 28 MMOL/L (ref 21–32)
CREAT SERPL-MCNC: 0.68 MG/DL (ref 0.6–1.2)
EOSINOPHIL NFR BLD AUTO: 196 K/UL (ref 130–400)
GLUCOSE SERPL-MCNC: 143 MG/DL (ref 70–99)
HCT VFR BLD CALC: 32.5 % (ref 37–47)
HGB BLD-MCNC: 11 G/DL (ref 12–16)
MCH RBC QN AUTO: 33.7 PG (ref 25–34)
MCHC RBC AUTO-ENTMCNC: 33.8 G/DL (ref 32–36)
MCV RBC AUTO: 99.7 FL (ref 80–100)
PMV BLD AUTO: 9.1 FL (ref 7.4–10.4)
POTASSIUM SERPL-SCNC: 3.8 MMOL/L (ref 3.5–5.1)
RED CELL DISTRIBUTION WIDTH CV: 14.6 % (ref 11.5–14.5)
RED CELL DISTRIBUTION WIDTH SD: 52.8 FL (ref 36.4–46.3)
SODIUM SERPL-SCNC: 136 MMOL/L (ref 136–145)
WBC # BLD AUTO: 13.93 K/UL (ref 4.8–10.8)

## 2018-04-28 RX ADMIN — Medication SCH MG: at 07:36

## 2018-04-28 RX ADMIN — FERROUS GLUCONATE SCH MG: 324 TABLET ORAL at 07:34

## 2018-04-28 RX ADMIN — GUAIFENESIN SCH MG: 600 TABLET, EXTENDED RELEASE ORAL at 07:35

## 2018-04-28 RX ADMIN — LEVALBUTEROL PRN MG: 1.25 SOLUTION, CONCENTRATE RESPIRATORY (INHALATION) at 05:30

## 2018-04-28 RX ADMIN — METHYLPREDNISOLONE SODIUM SUCCINATE SCH MLS/MIN: 1 INJECTION, POWDER, FOR SOLUTION INTRAMUSCULAR; INTRAVENOUS at 08:12

## 2018-04-28 RX ADMIN — LIDOCAINE SCH PATCH: 50 PATCH CUTANEOUS at 07:37

## 2018-04-28 RX ADMIN — IPRATROPIUM BROMIDE SCH MG: 0.5 SOLUTION RESPIRATORY (INHALATION) at 01:49

## 2018-04-28 RX ADMIN — IPRATROPIUM BROMIDE SCH MG: 0.5 SOLUTION RESPIRATORY (INHALATION) at 06:48

## 2018-04-28 RX ADMIN — AZITHROMYCIN SCH MG: 250 TABLET, FILM COATED ORAL at 07:35

## 2018-04-28 RX ADMIN — LEVALBUTEROL SCH MG: 1.25 SOLUTION, CONCENTRATE RESPIRATORY (INHALATION) at 14:39

## 2018-04-28 RX ADMIN — LEVALBUTEROL SCH MG: 1.25 SOLUTION, CONCENTRATE RESPIRATORY (INHALATION) at 06:48

## 2018-04-28 RX ADMIN — Medication SCH TAB: at 07:35

## 2018-04-28 RX ADMIN — IPRATROPIUM BROMIDE PRN MG: 0.5 SOLUTION RESPIRATORY (INHALATION) at 05:30

## 2018-04-28 RX ADMIN — NICOTINE SCH PATCH: 7 PATCH, EXTENDED RELEASE TRANSDERMAL at 07:37

## 2018-04-28 RX ADMIN — Medication SCH MG: at 07:35

## 2018-04-28 RX ADMIN — Medication SCH EA: at 20:49

## 2018-04-28 RX ADMIN — MORPHINE SULFATE PRN MG: 2 INJECTION, SOLUTION INTRAMUSCULAR; INTRAVENOUS at 05:12

## 2018-04-28 RX ADMIN — VENLAFAXINE HYDROCHLORIDE SCH MG: 75 CAPSULE, EXTENDED RELEASE ORAL at 07:35

## 2018-04-28 RX ADMIN — MORPHINE SULFATE PRN MG: 2 INJECTION, SOLUTION INTRAMUSCULAR; INTRAVENOUS at 15:28

## 2018-04-28 RX ADMIN — LOSARTAN POTASSIUM AND HYDROCHLOROTHIAZIDE SCH TAB: 12.5; 5 TABLET ORAL at 07:36

## 2018-04-28 RX ADMIN — DILTIAZEM HYDROCHLORIDE SCH MG: 120 CAPSULE, COATED, EXTENDED RELEASE ORAL at 07:35

## 2018-04-28 RX ADMIN — METOPROLOL SUCCINATE SCH MG: 25 TABLET, EXTENDED RELEASE ORAL at 07:35

## 2018-04-28 RX ADMIN — FERROUS GLUCONATE SCH MG: 324 TABLET ORAL at 11:30

## 2018-04-28 RX ADMIN — RIVAROXABAN SCH MG: 20 TABLET, FILM COATED ORAL at 17:42

## 2018-04-28 RX ADMIN — DOCUSATE SODIUM SCH MG: 100 CAPSULE, LIQUID FILLED ORAL at 07:34

## 2018-04-28 RX ADMIN — METOPROLOL SUCCINATE SCH MG: 25 TABLET, EXTENDED RELEASE ORAL at 20:48

## 2018-04-28 RX ADMIN — DOCUSATE SODIUM SCH MG: 100 CAPSULE, LIQUID FILLED ORAL at 20:48

## 2018-04-28 RX ADMIN — LEVALBUTEROL SCH MG: 1.25 SOLUTION, CONCENTRATE RESPIRATORY (INHALATION) at 01:50

## 2018-04-28 RX ADMIN — IPRATROPIUM BROMIDE SCH MG: 0.5 SOLUTION RESPIRATORY (INHALATION) at 14:39

## 2018-04-28 RX ADMIN — FERROUS GLUCONATE SCH MG: 324 TABLET ORAL at 17:00

## 2018-04-28 RX ADMIN — GUAIFENESIN SCH MG: 600 TABLET, EXTENDED RELEASE ORAL at 20:48

## 2018-04-28 RX ADMIN — ATORVASTATIN CALCIUM SCH MG: 40 TABLET, FILM COATED ORAL at 20:48

## 2018-04-28 NOTE — CARDIOLOGY FOLLOW-UP
Subjective


Date of Service:


Apr 28, 2018.


Pt evaluation today including:  conversation w/ patient, physical exam, chart 

review, lab review, review of studies, review of inpatient medication list


History of Present Illness


This morning patient states that her breathing is somewhat better.  She appears 

more comfortable.  She has been ambulatory to the commode and around her room 

with some dyspnea and weakness.  No sense of palpitation.





Social History


Smoking Status:  Current Every Day Smoker





Review of Systems


Respiratory:  + sputum, + wheezing, + shortness of breath


Cardiac:  No chest pain, No orthopnea, No palpitations


Per HPI





Objective





Vital Signs Past 12 Hours








  Date Time  Temp Pulse Resp B/P (MAP) Pulse Ox O2 Delivery O2 Flow Rate FiO2


 


4/28/18 06:54  116 18  98 Nasal Cannula 4.0 


 


4/28/18 05:30  86 18  96 Nasal Cannula 4.0 


 


4/28/18 04:00     92 Nasal Cannula 3.5 





      Humidified Air  


 


4/28/18 03:32 37.0 100 20 157/87 (110) 97 Nasal Cannula 4.0 


 


4/28/18 01:50  84 16  95 Nasal Cannula 4.0 


 


4/28/18 00:01     92 Nasal Cannula 3.5 





      Humidified Air  


 


4/27/18 23:40 36.5 101 20 153/107 (122) 92 Nasal Cannula 4.0 


 


4/27/18 22:03  89 18  95 Nasal Cannula 4.0 








Last Recorded Weight-Kilograms:  73.600


Physical Exam


She is alert and oriented x3.  Mood affect appear normal. She answered all 

questions appropriately.


HEENT:  Sclerae are anicteric.  Pupils are equal and reactive to light and 

accommodation.  Extraocular movements were intact.


Neuro:  Cranial nerves intact


Neck:  Examination of the submandibular region did not reveal any significant 

lymphadenopathy.  


Lungs:  Reduced breath sounds in pulmonary excursion.  Some expiratory 

wheezing.  Occasional crackle in the bases bilaterally


Cardiac:  The rhythm was irregular.  S1 and S2 were normal.  Holosystolic 

murmur of variable intensity.  The PMI was not markedly displaced on palpation.


Abdomen:  The abdomen was soft and nontender.


Skin:  There are no rashes noted on examination today.  Mild ecchymosis at the 

site of her loop recorder implant





Data


Laboratory Results:





Last 24 Hours








Test


  4/28/18


05:49


 


White Blood Count 13.93 K/uL 


 


Red Blood Count 3.26 M/uL 


 


Hemoglobin 11.0 g/dL 


 


Hematocrit 32.5 % 


 


Mean Corpuscular Volume 99.7 fL 


 


Mean Corpuscular Hemoglobin 33.7 pg 


 


Mean Corpuscular Hemoglobin


Concent 33.8 g/dl 


 


 


RDW Standard Deviation 52.8 fL 


 


RDW Coefficient of Variation 14.6 % 


 


Platelet Count 196 K/uL 


 


Mean Platelet Volume 9.1 fL 


 


Sodium Level 136 mmol/L 


 


Potassium Level 3.8 mmol/L 


 


Chloride Level 101 mmol/L 


 


Carbon Dioxide Level 28 mmol/L 


 


Anion Gap 7.0 mmol/L 


 


Blood Urea Nitrogen 21 mg/dl 


 


Creatinine 0.68 mg/dl 


 


Est Creatinine Clear Calc


Drug Dose 91.6 ml/min 


 


 


Estimated GFR (


American) 107.9 


 


 


Estimated GFR (Non-


American 93.1 


 


 


BUN/Creatinine Ratio 30.6 


 


Random Glucose 143 mg/dl 


 


Calcium Level 9.1 mg/dl 

















Telemetry reviewed:  Atrial fibrillation with variable ventricular response





Assessment and Plan


1. Atrial fibrillation:  Persistent.  Overall heart rates are suboptimally 

controlled.  Her beta-blocker was reduced and diltiazem increase yesterday.  I 

think she will require additional dose diltiazem later this afternoon.  

Maintaining her current dose of metoprolol or possibly reducing it to 25 mg 

daily would seem reasonable.  Continue Xarelto.





2.  History of endocarditis:  Mitral valve endocarditis was diagnosed 2 years 

ago.  Valvular function appears to be stable with moderate mitral regurgitation.





3. Dyspnea:  She seems to have had some mild improvement since her admission.  

Presumably COPD exacerbation.





4. Possible dynamic outflow tract obstruction:  Maintaining on some dose of 

metoprolol may be helpful.  Controlling her heart rate is also beneficial.





5. Syncope:  Unclear etiology.  Loop recorder in place.

## 2018-04-28 NOTE — FAMILY MEDICINE PROGRESS NOTE
Progress Note


Date of Service


Apr 28, 2018.





Subjective


Pt evaluation today including:  conversation w/ patient, conversation w/ family

, physical exam, chart review, lab review


Voiding:  no voiding problems


Patient still gets SOB with ambulate and reports a dry cough.





   Constitutional:  No fever, No chills, No sweats


   Respiratory:  + cough, + wheezing, + shortness of breath, + dyspnea on 

exertion, No sputum


   Cardiovascular:  No chest pain, No edema, No palpitations


   Abdomen:  No pain, No nausea, No vomiting


   Female :  No dysuria





Medications





Current Inpatient Medications








 Medications


  (Trade)  Dose


 Ordered  Sig/Kate


 Route  Start Time


 Stop Time Status Last Admin


Dose Admin


 


 Aspirin


  (Ecotrin Tab)  81 mg  QAM


 PO  4/25/18 09:00


 5/25/18 08:59  4/28/18 07:36


81 MG


 


 Atorvastatin


 Calcium


  (Lipitor Tab)  40 mg  HS


 PO  4/24/18 21:00


 5/24/18 20:59  4/27/18 20:48


40 MG


 


 Ferrous Gluconate


  (Ferrous


 Gluconate Tab)  324 mg  TIDM


 PO  4/25/18 07:30


 5/25/18 07:59  4/28/18 07:34


324 MG


 


 Rivaroxaban


  (Xarelto Tab)  20 mg  QDD


 PO  4/25/18 16:45


 5/25/18 16:44  4/27/18 16:03


20 MG


 


 Venlafaxine HCl


  (effeXOR


 EXTENDED REL CAP)  75 mg  QAM


 PO  4/25/18 09:00


 5/25/18 08:59  4/28/18 07:35


75 MG


 


 Guaifenesin


  (Mucinex Contr


 Rel Tab)  600 mg  Q12


 PO  4/24/18 21:00


 5/24/18 20:59  4/28/18 07:35


600 MG


 


 Azithromycin


  (Zithromax Tab)  250 mg  QAM


 PO  4/25/18 09:00


 4/29/18 08:59  4/28/18 07:35


250 MG


 


 Ipratropium


 Bromide


  (Atrovent 0.02%


 0.5MG/2.5ML Neb)  0.5 mg  Q6R


 INH  4/25/18 03:00


 5/25/18 02:59  4/28/18 06:48


0.5 MG


 


 Levalbuterol


  (Xopenex 1.25MG/


 0.5ML Neb)  1.25 mg  Q6R


 INH  4/25/18 03:00


 5/25/18 02:59  4/28/18 06:48


1.25 MG


 


 Ipratropium


 Bromide


  (Atrovent 0.02%


 0.5MG/2.5ML Neb)  0.5 mg  Q2H  PRN


 INH  4/24/18 21:30


 5/24/18 21:29  4/28/18 05:30


0.5 MG


 


 Levalbuterol


  (Xopenex 1.25MG/


 0.5ML Neb)  1.25 mg  Q2H  PRN


 INH  4/24/18 21:30


 5/24/18 21:29  4/28/18 05:30


1.25 MG


 


 Lidocaine


  (Lidoderm Patch


 5%)  1 patch  QAM


 TD  4/26/18 09:00


 5/26/18 08:59  4/28/18 07:37


1 PATCH


 


 Miscellaneous


  (Remove Lidoderm


 Patch)  1 ea  DAILY@21


 N/A  4/25/18 21:00


 5/25/18 20:59  4/27/18 20:46


1 EA


 


 Nicotine


  (Nicoderm Cq 7


 Mg Patch)  1 patch  QAM


 TD  4/25/18 12:00


 5/25/18 11:59  4/28/18 07:37


1 PATCH


 


 Miscellaneous


  (Remove Nicoderm


 Patch)  1 ea  HS


 N/A  4/25/18 21:00


 5/25/18 20:59  4/27/18 20:46


1 EA


 


 Morphine Sulfate


  (MoRPHine


 SULFATE INJ)  1 mg  Q6H  PRN


 IV  4/25/18 18:00


 5/9/18 17:59  4/28/18 05:12


1 MG


 


 Thiamine HCl


  (Vitamin B-1 Tab)  100 mg  DAILY


 PO  4/26/18 02:00


 4/29/18 09:01  4/28/18 07:35


100 MG


 


 Methylprednisolone


 Sodium Succinate


 40 mg/Syringe  0.64 ml @ 


 1.5 mls/min  BID


 IV  4/26/18 21:00


 5/26/18 20:59  4/28/18 08:12


1.5 MLS/MIN


 


 Lorazepam


  (Ativan Inj)  PRN Dosing


 -Active


 Protocol  Q1H  PRN


 IV  4/26/18 10:15


 5/26/18 10:14  4/26/18 11:10


1 MG


 


 Docusate Sodium


  (coLACE CAP)  100 mg  BID


 PO  4/26/18 21:00


 5/26/18 20:59  4/28/18 07:34


100 MG


 


 Multivitamins


  (Multivitamin


 Tab)  1 tab  QAM


 PO  4/27/18 09:00


 5/27/18 08:59  4/28/18 07:35


1 TAB


 


 Folic Acid


  (Folvite Tab)  1 mg  QAM


 PO  4/27/18 09:00


 5/27/18 08:59  4/28/18 07:36


1 MG


 


 Diltiazem HCl


  (Dilacor Xr Cap)  240 mg  QAM


 PO  4/27/18 09:00


 5/26/18 08:59  4/28/18 07:35


240 MG


 


 Metoprolol


 Succinate


  (Toprol Xl Tab)  25 mg  BID


 PO  4/27/18 09:00


 5/25/18 08:59  4/28/18 07:35


25 MG


 


 HCTZ/Losartan


 Potassium


  (Hyzaar 50-12.5


 Tab)  1 tab  DAILY


 PO  4/28/18 09:00


 5/28/18 08:59  4/28/18 07:36


1 TAB











Objective


Vital Signs











  Date Time  Temp Pulse Resp B/P (MAP) Pulse Ox O2 Delivery O2 Flow Rate FiO2


 


4/28/18 08:00      Nasal Cannula 3.0 


 


4/28/18 07:26 36.4 132 20 161/118 (132) 95 Nasal Cannula 3.0 





    174/112 (132)    


 


4/28/18 06:54  116 18  98 Nasal Cannula 4.0 


 


4/28/18 05:30  86 18  96 Nasal Cannula 4.0 


 


4/28/18 04:00     92 Nasal Cannula 3.5 





      Humidified Air  


 


4/28/18 03:32 37.0 100 20 157/87 (110) 97 Nasal Cannula 4.0 


 


4/28/18 01:50  84 16  95 Nasal Cannula 4.0 


 


4/28/18 00:01     92 Nasal Cannula 3.5 





      Humidified Air  


 


4/27/18 23:40 36.5 101 20 153/107 (122) 92 Nasal Cannula 4.0 


 


4/27/18 22:03  89 18  95 Nasal Cannula 4.0 


 


4/27/18 20:00     97 Nasal Cannula 3.5 





      Humidified Air  


 


4/27/18 19:19 36.5 84 16 112/68 (83) 97 Nasal Cannula 2.0 


 


4/27/18 18:50  85 16  95 Nasal Cannula 4.0 


 


4/27/18 16:14     95 Nasal Cannula 4.0 


 


4/27/18 15:48 36.4 101 18 134/67 (89) 95 Nasal Cannula 4.0 


 


4/27/18 14:28  88 20  97 Nasal Cannula 4.0 


 


4/27/18 13:57    145/83 (103)    


 


4/27/18 12:05     95 Nasal Cannula 4.0 











Physical Exam


General Appearance:  WD/WN, no apparent distress


Neck:  supple, no adenopathy, thyroid normal


Respiratory/Chest:  chest non-tender, normal breath sounds, no respiratory 

distress, no accessory muscle use, + wheezing (bilateral expiratory )


Cardiovascular:  regular rate, rhythm, no edema, no gallop, no JVD, no murmur


Abdomen:  non tender, soft


Extremities:  non-tender, normal inspection


Neurologic/Psychiatric:  alert, normal mood/affect, oriented x 3


Skin:  normal color, warm/dry, no rash





Laboratory Results


4/28/18 05:49








4/28/18 05:49

















Test


  4/28/18


05:49


 


Red Blood Count


  3.26 M/uL


(4.2-5.4)


 


Mean Corpuscular Volume


  99.7 fL


()


 


Mean Corpuscular Hemoglobin


  33.7 pg


(25-34)


 


Mean Corpuscular Hemoglobin


Concent 33.8 g/dl


(32-36)


 


RDW Standard Deviation


  52.8 fL


(36.4-46.3)


 


RDW Coefficient of Variation


  14.6 %


(11.5-14.5)


 


Mean Platelet Volume


  9.1 fL


(7.4-10.4)


 


Anion Gap


  7.0 mmol/L


(3-11)


 


Est Creatinine Clear Calc


Drug Dose 91.6 ml/min 


 


 


Estimated GFR (


American) 107.9 


 


 


Estimated GFR (Non-


American 93.1 


 


 


BUN/Creatinine Ratio 30.6 (10-20) 


 


Calcium Level


  9.1 mg/dl


(8.5-10.1)











Assessment and Plan


62 F PMH pAFIB with rvr, COPD and ETOH abuse presents with; syncopal episodes 

subsequent fall and sacrum fx. Pt is also experiencing COPD exacerbation. 





Plan today; 


Afib--pt HR was <100 overnight-- 100-115 upon waking. Will go up to 360 mg of 

Diltiazem. Patient being transferred to a medrg floor. 





COPD ex--transition to 60 mg PO Prednisone, cont. Azithromycin. 





COPD exacerbation with acute hypoxic respiratory failure


- Most recent PFTs, FEV1/FVC 65


- O2 goal sat 92%


- Nebulizers PRN


- 60 mg PO Prednisone 


- Continue Azithromycin (day 4/5)





Atrial flutter/fibrillation


- Recs by cardiology appreciated


- To offset risk of bronchospasm; de-escalated Metoprolol


- Diltazem to 360 mg daily today


- On Xarelto


- Goal HR < 100





Repeated syncopal episodes


- Loop recorder in situ


- Cardiology recs appreciated





Alcohol abuse


- On active withdrawal protocol with Lorazepam


- Has demonstrated willingness to participate in patient rehabilitation


- Continue multivitamin, folate and thiamine supplementation





Hx fall with sacral fracture


- Ortho recommendations appreciated; non-operative/conservative approach


- Pain management: acetaminophen, heat pack, lidocaine patch.


- Avoid NSAIDs due to Xarelto use


- PT/OT 





History of Infective Endocarditis


- No evidence of intracardiac infection





Anxiety, Bipolar, depression


- Continue venlafaxine and Lamictal





Hypertension


- Continue metoprolol. Diltiazem added, as above.


- Can resume Losartan due to resolved ROLAND





Acute Kidney Injury


- Resolved





Tobacco abuse 


- Encourage cessation


- Nicotine patch ordered





VTE Prophylaxis


- Xarelto


- SCD/KINJAL





Code


- DNR 





Disposition


- Transfer to [facility] 


- OT/PT


Continued [facility] stay due to:  abnormal vital signs


Discharge planning:  uncertain





Assessment/Plan


Resident Physician Supervision Note:





I was present with Dr. Barr during the history and exam. I discussed the 

case with the resident and agree with the findings and plan as documented in 

the note.  Any exceptions or clarifications are listed here: Continued 

improvement of shortness of breath and cough approaching respiratory baseline 

with more comfortable cough. Remains afebrile. In concordance w/ cardiology 

recommendations and persistent elevated HR and BP, will increase dilt and leave 

metoprolol at 25mg dose. Taper lorazepam to 0.25mg for anxiety tomorrow.





Agree w/ transition to med/surg floor.

## 2018-04-29 VITALS
HEART RATE: 84 BPM | TEMPERATURE: 97.88 F | OXYGEN SATURATION: 92 % | SYSTOLIC BLOOD PRESSURE: 113 MMHG | DIASTOLIC BLOOD PRESSURE: 80 MMHG

## 2018-04-29 VITALS
OXYGEN SATURATION: 90 % | DIASTOLIC BLOOD PRESSURE: 84 MMHG | SYSTOLIC BLOOD PRESSURE: 127 MMHG | HEART RATE: 77 BPM | TEMPERATURE: 97.88 F

## 2018-04-29 VITALS
OXYGEN SATURATION: 97 % | DIASTOLIC BLOOD PRESSURE: 62 MMHG | HEART RATE: 65 BPM | SYSTOLIC BLOOD PRESSURE: 111 MMHG | TEMPERATURE: 99.5 F

## 2018-04-29 VITALS
SYSTOLIC BLOOD PRESSURE: 139 MMHG | OXYGEN SATURATION: 95 % | DIASTOLIC BLOOD PRESSURE: 85 MMHG | TEMPERATURE: 97.88 F | HEART RATE: 81 BPM

## 2018-04-29 VITALS
DIASTOLIC BLOOD PRESSURE: 81 MMHG | TEMPERATURE: 98.06 F | SYSTOLIC BLOOD PRESSURE: 131 MMHG | OXYGEN SATURATION: 90 % | HEART RATE: 76 BPM

## 2018-04-29 VITALS
OXYGEN SATURATION: 98 % | HEART RATE: 92 BPM | SYSTOLIC BLOOD PRESSURE: 153 MMHG | DIASTOLIC BLOOD PRESSURE: 92 MMHG | TEMPERATURE: 98.06 F

## 2018-04-29 VITALS — OXYGEN SATURATION: 94 %

## 2018-04-29 VITALS — OXYGEN SATURATION: 91 %

## 2018-04-29 LAB
BUN SERPL-MCNC: 20 MG/DL (ref 7–18)
CALCIUM SERPL-MCNC: 9 MG/DL (ref 8.5–10.1)
CO2 SERPL-SCNC: 31 MMOL/L (ref 21–32)
CREAT SERPL-MCNC: 0.63 MG/DL (ref 0.6–1.2)
EOSINOPHIL NFR BLD AUTO: 232 K/UL (ref 130–400)
GLUCOSE SERPL-MCNC: 132 MG/DL (ref 70–99)
HCT VFR BLD CALC: 32.7 % (ref 37–47)
HGB BLD-MCNC: 11.2 G/DL (ref 12–16)
MCH RBC QN AUTO: 34.1 PG (ref 25–34)
MCHC RBC AUTO-ENTMCNC: 34.3 G/DL (ref 32–36)
MCV RBC AUTO: 99.7 FL (ref 80–100)
PMV BLD AUTO: 9.1 FL (ref 7.4–10.4)
POTASSIUM SERPL-SCNC: 3.8 MMOL/L (ref 3.5–5.1)
RED CELL DISTRIBUTION WIDTH CV: 14.5 % (ref 11.5–14.5)
RED CELL DISTRIBUTION WIDTH SD: 52.8 FL (ref 36.4–46.3)
SODIUM SERPL-SCNC: 137 MMOL/L (ref 136–145)
WBC # BLD AUTO: 13.6 K/UL (ref 4.8–10.8)

## 2018-04-29 RX ADMIN — AZITHROMYCIN SCH MG: 250 TABLET, FILM COATED ORAL at 12:52

## 2018-04-29 RX ADMIN — METOPROLOL SUCCINATE SCH MG: 25 TABLET, EXTENDED RELEASE ORAL at 08:51

## 2018-04-29 RX ADMIN — Medication SCH MG: at 08:50

## 2018-04-29 RX ADMIN — Medication SCH TAB: at 08:51

## 2018-04-29 RX ADMIN — FERROUS GLUCONATE SCH MG: 324 TABLET ORAL at 12:47

## 2018-04-29 RX ADMIN — MORPHINE SULFATE PRN MG: 2 INJECTION, SOLUTION INTRAMUSCULAR; INTRAVENOUS at 08:53

## 2018-04-29 RX ADMIN — FERROUS GLUCONATE SCH MG: 324 TABLET ORAL at 08:57

## 2018-04-29 RX ADMIN — LIDOCAINE SCH PATCH: 50 PATCH CUTANEOUS at 08:52

## 2018-04-29 RX ADMIN — Medication SCH EA: at 21:00

## 2018-04-29 RX ADMIN — VENLAFAXINE HYDROCHLORIDE SCH MG: 75 CAPSULE, EXTENDED RELEASE ORAL at 08:50

## 2018-04-29 RX ADMIN — LOSARTAN POTASSIUM AND HYDROCHLOROTHIAZIDE SCH TAB: 12.5; 5 TABLET ORAL at 08:51

## 2018-04-29 RX ADMIN — GUAIFENESIN SCH MG: 600 TABLET, EXTENDED RELEASE ORAL at 21:53

## 2018-04-29 RX ADMIN — ATORVASTATIN CALCIUM SCH MG: 40 TABLET, FILM COATED ORAL at 21:53

## 2018-04-29 RX ADMIN — METOPROLOL SUCCINATE SCH MG: 25 TABLET, EXTENDED RELEASE ORAL at 21:54

## 2018-04-29 RX ADMIN — FERROUS GLUCONATE SCH MG: 324 TABLET ORAL at 17:00

## 2018-04-29 RX ADMIN — GUAIFENESIN SCH MG: 600 TABLET, EXTENDED RELEASE ORAL at 08:52

## 2018-04-29 RX ADMIN — NICOTINE SCH PATCH: 7 PATCH, EXTENDED RELEASE TRANSDERMAL at 08:52

## 2018-04-29 RX ADMIN — RIVAROXABAN SCH MG: 20 TABLET, FILM COATED ORAL at 17:37

## 2018-04-29 RX ADMIN — Medication SCH MG: at 08:52

## 2018-04-29 RX ADMIN — MORPHINE SULFATE PRN MG: 2 INJECTION, SOLUTION INTRAMUSCULAR; INTRAVENOUS at 01:17

## 2018-04-29 RX ADMIN — MORPHINE SULFATE PRN MG: 2 INJECTION, SOLUTION INTRAMUSCULAR; INTRAVENOUS at 16:18

## 2018-04-29 RX ADMIN — DILTIAZEM HYDROCHLORIDE SCH MG: 120 CAPSULE, COATED, EXTENDED RELEASE ORAL at 08:50

## 2018-04-29 RX ADMIN — DOCUSATE SODIUM SCH MG: 100 CAPSULE, LIQUID FILLED ORAL at 21:53

## 2018-04-29 RX ADMIN — DOCUSATE SODIUM SCH MG: 100 CAPSULE, LIQUID FILLED ORAL at 08:50

## 2018-04-29 RX ADMIN — FERROUS GLUCONATE SCH MG: 324 TABLET ORAL at 08:51

## 2018-04-29 NOTE — FAMILY MEDICINE PROGRESS NOTE
Progress Note


Date of Service


Apr 29, 2018.





Subjective


Pt evaluation today including:  conversation w/ patient, conversation w/ family

, physical exam, chart review, lab review


Patient reports improved breathing. She says that she did not require breathing 

treatments last night or this am. She continues to get winded when she gets up 

to go to the bathroom.





   Constitutional:  No fever, No chills


   Respiratory:  + cough, + sputum, + dyspnea on exertion, No wheezing, No 

shortness of breath


   Cardiovascular:  No chest pain, No edema, No palpitations


   Abdomen:  No pain, No nausea, No vomiting, No diarrhea


   Female :  No dysuria, No urinary frequency, No hematuria





Medications





Current Inpatient Medications








 Medications


  (Trade)  Dose


 Ordered  Sig/Kate


 Route  Start Time


 Stop Time Status Last Admin


Dose Admin


 


 Aspirin


  (Ecotrin Tab)  81 mg  QAM


 PO  4/25/18 09:00


 5/25/18 08:59  4/29/18 08:50


81 MG


 


 Atorvastatin


 Calcium


  (Lipitor Tab)  40 mg  HS


 PO  4/24/18 21:00


 5/24/18 20:59  4/28/18 20:48


40 MG


 


 Ferrous Gluconate


  (Ferrous


 Gluconate Tab)  324 mg  TIDM


 PO  4/25/18 07:30


 5/25/18 07:59  4/28/18 07:34


324 MG


 


 Rivaroxaban


  (Xarelto Tab)  20 mg  QDD


 PO  4/25/18 16:45


 5/25/18 16:44  4/28/18 17:42


20 MG


 


 Venlafaxine HCl


  (effeXOR


 EXTENDED REL CAP)  75 mg  QAM


 PO  4/25/18 09:00


 5/25/18 08:59  4/29/18 08:50


75 MG


 


 Guaifenesin


  (Mucinex Contr


 Rel Tab)  600 mg  Q12


 PO  4/24/18 21:00


 5/24/18 20:59  4/29/18 08:52


600 MG


 


 Ipratropium


 Bromide


  (Atrovent 0.02%


 0.5MG/2.5ML Neb)  0.5 mg  Q6R


 INH  4/25/18 03:00


 5/25/18 02:59 Future Hold 4/28/18 14:39


0.5 MG


 


 Levalbuterol


  (Xopenex 1.25MG/


 0.5ML Neb)  1.25 mg  Q6R


 INH  4/25/18 03:00


 5/25/18 02:59 Future Hold 4/28/18 14:39


1.25 MG


 


 Ipratropium


 Bromide


  (Atrovent 0.02%


 0.5MG/2.5ML Neb)  0.5 mg  Q2H  PRN


 INH  4/24/18 21:30


 5/24/18 21:29  4/28/18 05:30


0.5 MG


 


 Levalbuterol


  (Xopenex 1.25MG/


 0.5ML Neb)  1.25 mg  Q2H  PRN


 INH  4/24/18 21:30


 5/24/18 21:29  4/28/18 05:30


1.25 MG


 


 Lidocaine


  (Lidoderm Patch


 5%)  1 patch  QAM


 TD  4/26/18 09:00


 5/26/18 08:59  4/29/18 08:52


1 PATCH


 


 Miscellaneous


  (Remove Lidoderm


 Patch)  1 ea  DAILY@21


 N/A  4/25/18 21:00


 5/25/18 20:59  4/28/18 20:49


1 EA


 


 Nicotine


  (Nicoderm Cq 7


 Mg Patch)  1 patch  QAM


 TD  4/25/18 12:00


 5/25/18 11:59  4/29/18 08:52


1 PATCH


 


 Miscellaneous


  (Remove Nicoderm


 Patch)  1 ea  HS


 N/A  4/25/18 21:00


 5/25/18 20:59  4/28/18 20:49


1 EA


 


 Morphine Sulfate


  (MoRPHine


 SULFATE INJ)  1 mg  Q6H  PRN


 IV  4/25/18 18:00


 5/9/18 17:59  4/29/18 08:53


1 MG


 


 Lorazepam


  (Ativan Inj)  PRN Dosing


 -Active


 Protocol  Q1H  PRN


 IV  4/26/18 10:15


 5/26/18 10:14  4/26/18 11:10


1 MG


 


 Docusate Sodium


  (coLACE CAP)  100 mg  BID


 PO  4/26/18 21:00


 5/26/18 20:59  4/29/18 08:50


100 MG


 


 Multivitamins


  (Multivitamin


 Tab)  1 tab  QAM


 PO  4/27/18 09:00


 5/27/18 08:59  4/29/18 08:51


1 TAB


 


 Folic Acid


  (Folvite Tab)  1 mg  QAM


 PO  4/27/18 09:00


 5/27/18 08:59  4/29/18 08:51


1 MG


 


 Metoprolol


 Succinate


  (Toprol Xl Tab)  25 mg  BID


 PO  4/27/18 09:00


 5/25/18 08:59  4/29/18 08:51


25 MG


 


 HCTZ/Losartan


 Potassium


  (Hyzaar 50-12.5


 Tab)  1 tab  DAILY


 PO  4/28/18 09:00


 5/28/18 08:59  4/29/18 08:51


1 TAB


 


 Prednisone


  (PredniSONE TAB)  40 mg  QAM


 PO  4/29/18 08:00


 5/29/18 08:59  4/29/18 08:51


40 MG


 


 Docusate Sodium


  (coLACE CAP)  100 mg  DAILY  PRN


 PO  4/28/18 12:15


 5/28/18 12:14   


 


 


 Polyethylene


  (Miralax Powder


 Packet)  17 gm  DAILY  PRN


 PO  4/28/18 12:15


 5/28/18 12:14   


 


 


 Diltiazem HCl


  (Dilacor Xr Cap)  360 mg  QAM


 PO  4/29/18 08:00


 5/29/18 07:59  4/29/18 08:50


360 MG











Objective


Vital Signs











  Date Time  Temp Pulse Resp B/P (MAP) Pulse Ox O2 Delivery O2 Flow Rate FiO2


 


4/29/18 12:47     94 Room Air  


 


4/29/18 11:32     91 Room Air  


 


4/29/18 11:07 37.5 65 20 111/62 (78) 97   


 


4/29/18 07:31 36.7 92 18 153/92 (112) 98   


 


4/29/18 04:00 36.6 81 20 139/85 (103) 95  2.0 


 


4/29/18 00:10      Room Air  


 


4/28/18 23:30 36.6 87 20 151/92 (111) 99 Room Air  


 


4/28/18 21:00      Nasal Cannula 2.0 


 


4/28/18 20:00 36.6 85 20 145/89 (107) 96  2.0 


 


4/28/18 16:00      Nasal Cannula 3.0 


 


4/28/18 14:39  101 16  98 Nasal Cannula 2.0 


 


4/28/18 14:30 36.2 83 20 144/87 (106) 95 Nasal Cannula 2.0 











Physical Exam


General Appearance:  WD/WN, no apparent distress


Neck:  supple, no adenopathy, thyroid normal


Respiratory/Chest:  lungs clear, normal breath sounds, no respiratory distress, 

no accessory muscle use, + wheezing (expirartory--reduced compared to yesterday 

)


Cardiovascular:  regular rate, rhythm, no edema, no gallop, no murmur


Abdomen:  non tender, soft


Extremities:  non-tender, normal inspection


Neurologic/Psychiatric:  CNs II-XII nml as tested, no motor/sensory deficits, 

alert, normal mood/affect, oriented x 3


Skin:  normal color, warm/dry, no rash





Laboratory Results


4/29/18 05:23








4/29/18 05:23

















Test


  4/29/18


05:23


 


Red Blood Count


  3.28 M/uL


(4.2-5.4)


 


Mean Corpuscular Volume


  99.7 fL


()


 


Mean Corpuscular Hemoglobin


  34.1 pg


(25-34)


 


Mean Corpuscular Hemoglobin


Concent 34.3 g/dl


(32-36)


 


RDW Standard Deviation


  52.8 fL


(36.4-46.3)


 


RDW Coefficient of Variation


  14.5 %


(11.5-14.5)


 


Mean Platelet Volume


  9.1 fL


(7.4-10.4)


 


Anion Gap


  6.0 mmol/L


(3-11)


 


Est Creatinine Clear Calc


Drug Dose 98.8 ml/min 


 


 


Estimated GFR (


American) 110.6 


 


 


Estimated GFR (Non-


American 95.5 


 


 


BUN/Creatinine Ratio 32.1 (10-20) 


 


Calcium Level


  9.0 mg/dl


(8.5-10.1)











Assessment and Plan


62 F PMH pAFIB with rvr, COPD and ETOH abuse presents with; syncopal episodes 

subsequent fall and sacrum fx. Pt is also experiencing COPD exacerbation. 





Assessment; The patient's condition is much improved. On exam, she had adequate 

air movement/expansion and significantly less wheezing. Today I weaned her off 

O2--no requirement at this time. PT says that they will reevaluate either 

tonight or tomorrow. Patient's HR is better controlled-- started 360 mg does of 

Diltiazem today. Will continue to monitor.  





Plan; Continue 60 mg Prednisone and final day of Azithromycin therapy 





Dispo; Patient agrees to inpatient alcohol rehab. Need PT assessment of 

functional status to determine if patient needs functional rehabilitation prior 

to alcohol rehab. Will inform case management regarding the plan. 








COPD exacerbation with acute hypoxic respiratory failure


- Most recent PFTs, FEV1/FVC 65


- O2 goal sat 92%


- Nebulizers PRN


- 60 mg PO Prednisone 


- Continue Azithromycin (day 5/5)





Atrial flutter/fibrillation


- Recs by cardiology appreciated


- To offset risk of bronchospasm; de-escalated Metoprolol


- Diltazem to 360 mg daily today


- On Xarelto


- Goal HR < 100





Repeated syncopal episodes


- Loop recorder in situ


- Cardiology recs appreciated





Alcohol abuse


- On active withdrawal protocol with Lorazepam


- Has demonstrated willingness to participate in patient rehabilitation


- Continue multivitamin, folate and thiamine supplementation





Hx fall with sacral fracture


- Ortho recommendations appreciated; non-operative/conservative approach


- Pain management: acetaminophen, heat pack, lidocaine patch.


- Avoid NSAIDs due to Xarelto use


- PT/OT 





History of Infective Endocarditis


- No evidence of intracardiac infection





Anxiety, Bipolar, depression


- Continue venlafaxine and Lamictal





Hypertension


- Continue metoprolol. Diltiazem added, as above.


- Can resume Losartan due to resolved ROLAND





Acute Kidney Injury


- Resolved





Tobacco abuse 


- Encourage cessation


- Nicotine patch ordered





VTE Prophylaxis


- Xarelto


- SCD/KINJAL





Code


- DNR 











Assessment/Plan


Resident Physician Supervision Note:





I was present with Dr. Barr during the history and exam. I discussed the 

case with the resident and agree with the findings and plan as documented in 

the note.  Any exceptions or clarifications are listed here: 





Improvement in respiratory status and control of BP/HR. Will continue present 

therapy for both and gradually taper steroids. In light of underlying etOH abuse

, patient is willing to transition to inpatient rehabilitation for same if 

possible. At present, will have to wean off O2 today (which should be feasible)

, be evaluated and cleared by PT (otherwise to physical rehab then inpatient 

etOH rehab), or be transitioned to inpatient etOH rehab with assistance of case 

mgmt ([place]).

## 2018-04-30 VITALS
TEMPERATURE: 97.52 F | SYSTOLIC BLOOD PRESSURE: 131 MMHG | OXYGEN SATURATION: 93 % | DIASTOLIC BLOOD PRESSURE: 87 MMHG | HEART RATE: 72 BPM

## 2018-04-30 VITALS
TEMPERATURE: 97.7 F | SYSTOLIC BLOOD PRESSURE: 129 MMHG | HEART RATE: 72 BPM | OXYGEN SATURATION: 91 % | DIASTOLIC BLOOD PRESSURE: 75 MMHG

## 2018-04-30 VITALS
OXYGEN SATURATION: 94 % | TEMPERATURE: 97.88 F | DIASTOLIC BLOOD PRESSURE: 83 MMHG | HEART RATE: 91 BPM | SYSTOLIC BLOOD PRESSURE: 134 MMHG

## 2018-04-30 VITALS
OXYGEN SATURATION: 94 % | HEART RATE: 105 BPM | SYSTOLIC BLOOD PRESSURE: 165 MMHG | DIASTOLIC BLOOD PRESSURE: 106 MMHG | TEMPERATURE: 97.52 F

## 2018-04-30 LAB
BUN SERPL-MCNC: 22 MG/DL (ref 7–18)
CALCIUM SERPL-MCNC: 9.4 MG/DL (ref 8.5–10.1)
CO2 SERPL-SCNC: 32 MMOL/L (ref 21–32)
CREAT SERPL-MCNC: 0.66 MG/DL (ref 0.6–1.2)
EOSINOPHIL NFR BLD AUTO: 271 K/UL (ref 130–400)
GLUCOSE SERPL-MCNC: 85 MG/DL (ref 70–99)
HCT VFR BLD CALC: 36 % (ref 37–47)
HGB BLD-MCNC: 12.1 G/DL (ref 12–16)
MCH RBC QN AUTO: 33.5 PG (ref 25–34)
MCHC RBC AUTO-ENTMCNC: 33.6 G/DL (ref 32–36)
MCV RBC AUTO: 99.7 FL (ref 80–100)
PMV BLD AUTO: 9.3 FL (ref 7.4–10.4)
POTASSIUM SERPL-SCNC: 3.6 MMOL/L (ref 3.5–5.1)
RED CELL DISTRIBUTION WIDTH CV: 14.6 % (ref 11.5–14.5)
RED CELL DISTRIBUTION WIDTH SD: 52.8 FL (ref 36.4–46.3)
SODIUM SERPL-SCNC: 138 MMOL/L (ref 136–145)
WBC # BLD AUTO: 14.25 K/UL (ref 4.8–10.8)

## 2018-04-30 RX ADMIN — VENLAFAXINE HYDROCHLORIDE SCH MG: 75 CAPSULE, EXTENDED RELEASE ORAL at 08:00

## 2018-04-30 RX ADMIN — METOPROLOL SUCCINATE SCH MG: 25 TABLET, EXTENDED RELEASE ORAL at 08:01

## 2018-04-30 RX ADMIN — FERROUS GLUCONATE SCH MG: 324 TABLET ORAL at 17:00

## 2018-04-30 RX ADMIN — FERROUS GLUCONATE SCH MG: 324 TABLET ORAL at 11:03

## 2018-04-30 RX ADMIN — MORPHINE SULFATE PRN MG: 2 INJECTION, SOLUTION INTRAMUSCULAR; INTRAVENOUS at 08:09

## 2018-04-30 RX ADMIN — Medication SCH MG: at 08:00

## 2018-04-30 RX ADMIN — DILTIAZEM HYDROCHLORIDE SCH MG: 120 CAPSULE, COATED, EXTENDED RELEASE ORAL at 08:00

## 2018-04-30 RX ADMIN — LOSARTAN POTASSIUM AND HYDROCHLOROTHIAZIDE SCH TAB: 12.5; 5 TABLET ORAL at 08:00

## 2018-04-30 RX ADMIN — ATORVASTATIN CALCIUM SCH MG: 40 TABLET, FILM COATED ORAL at 20:54

## 2018-04-30 RX ADMIN — METOPROLOL SUCCINATE SCH MG: 25 TABLET, EXTENDED RELEASE ORAL at 20:54

## 2018-04-30 RX ADMIN — GUAIFENESIN SCH MG: 600 TABLET, EXTENDED RELEASE ORAL at 08:01

## 2018-04-30 RX ADMIN — Medication SCH TAB: at 08:00

## 2018-04-30 RX ADMIN — Medication SCH EA: at 20:54

## 2018-04-30 RX ADMIN — LIDOCAINE SCH PATCH: 50 PATCH CUTANEOUS at 08:01

## 2018-04-30 RX ADMIN — FERROUS GLUCONATE SCH MG: 324 TABLET ORAL at 07:58

## 2018-04-30 RX ADMIN — DOCUSATE SODIUM SCH MG: 100 CAPSULE, LIQUID FILLED ORAL at 20:54

## 2018-04-30 RX ADMIN — DOCUSATE SODIUM SCH MG: 100 CAPSULE, LIQUID FILLED ORAL at 08:00

## 2018-04-30 RX ADMIN — GUAIFENESIN SCH MG: 600 TABLET, EXTENDED RELEASE ORAL at 20:55

## 2018-04-30 RX ADMIN — ACETAMINOPHEN PRN MG: 500 TABLET, FILM COATED ORAL at 16:33

## 2018-04-30 RX ADMIN — NICOTINE SCH PATCH: 7 PATCH, EXTENDED RELEASE TRANSDERMAL at 08:02

## 2018-04-30 RX ADMIN — RIVAROXABAN SCH MG: 20 TABLET, FILM COATED ORAL at 17:29

## 2018-04-30 NOTE — CARDIOLOGY PROGRESS NOTE
DATE: 04/30/2018

 

TIME:  12:44 p.m.

 

SUBJECTIVE:  She was seen earlier this morning at approximately 9:30 a.m. 

She states that her breathing is not yet back to baseline, but has improved

significantly.  She is now on room air.  She denies angina, syncope, near

syncope, palpitations or edema.  She is looking forward to attending an

alcohol rehabilitation program and would like to be discharged from here to a

program to help her with her addiction.

 

Her sister, Mendel, is present at the bedside.

 

OBJECTIVE:

VITAL SIGNS:  Temperature is 36.4 degrees, heart rate 72 beats per minute,

respiration rate 18, blood pressure 131/87 mmHg, oxygen saturation 93% on

room air.  Weight 71.4 kg.

GENERAL:  In no acute distress.  She is alert.

NECK:  No JVD.

CARDIAC EXAM:  No ventricular heave.  Irregularly irregular.  Normal S1, S2. 

There is a 2/6 holosystolic murmur best heard at the apex.  No rubs or

gallops.

LUNGS:  Improved breath sounds compared to evaluation on 04/26/2018.  No

wheezing.  Lungs sounded clear.

ABDOMEN:  Soft, nontender, nondistended.  Normoactive bowel sounds.

EXTREMITIES:  No cyanosis or edema.

PSYCHIATRIC:  Affect appears appropriate.

 

MEDICATIONS:  Include diltiazem 360 mg daily, atorvastatin 40 mg at bedtime,

aspirin 81 mg daily, losartan/HCTZ 50/12.5 one tablet daily, metoprolol

succinate 25 mg p.o. b.i.d., prednisone 40 mg daily, Xarelto 20 mg daily,

Effexor 75 mg daily.

 

LABORATORY DATA:  White blood cell count is 14.25, hemoglobin 12.1, platelets

271.  Sodium 138, potassium 3.6, BUN 22, creatinine 0.66.

 

ECG performed earlier today, personally reviewed, atrial fibrillation at 104

BPM.  PVCs.

 

ASSESSMENT AND PLAN:

1.  Atrial fibrillation:  Atrial fibrillation appears persistent.  Continue

rate controlling strategy.  Her heart rate appears to be adequately

controlled for the most part on diltiazem, current dose 360 mg daily. 

Metoprolol was reduced during this hospitalization for concern that it may be

contributing to her COPD exacerbation by primary service.  Continue

anticoagulation for stroke risk reduction.  No changes recommended at this

time.

2.  Shortness of breath:  Has improved significantly for treatment of her

COPD exacerbation by primary service.  She appears euvolemic.

3.  Syncope:  She had out of hospital of syncope.  Etiology uncertain.  Loop

recorder in place as per Dr. Kocher.  Follow up as per Dr. Kocher.

4.  Alcohol abuse:  She is hoping to attend an alcoholic rehabilitation

center upon discharge.

5.  Tobacco abuse:  It has been recommended that she stop smoking.

6.  Mitral regurgitation:  This has been nonsevere.  She did have

endocarditis in the past.  This can be monitored over time.  Echocardiogram

during this hospitalization reported moderate mitral regurgitation.

7.  History of endocarditis:  Blood culture is negative.  SBE prophylaxis for

dental procedures.

8.  Disposition:  Followup for routine loop recorder care.  Otherwise, keep

scheduled appointment with cardiology.  No further cardiac recommendations at

this time.  Can be discharged from a cardiac perspective.

 

I will be away from the hospital tomorrow.  If there are any questions or

concerns, please do not hesitate to contact the on-call cardiologist, Dr. Alas.

## 2018-04-30 NOTE — FAMILY MEDICINE PROGRESS NOTE
Progress Note


Date of Service


Apr 30, 2018.





Subjective


Patient feels better, and is comfortable on room air. She does feel 

conscientious of increased deep breathing with some exertion but denies 

dizziness, chest pain, palpitations, or dyspnea. She otherwise denies fevers/

chills, headaches, abdominal pain, lower extremity swelling or rashes. Her 

sacral pain is intermittent and she has identified movements which trigger it 

and tries to avoid it. She is tolerating diet without nausea or vomiting, and 

voiding and stooling appropriately.





ROS is unremarkable except as noted above.





Objective


Vital Signs











  Date Time  Temp Pulse Resp B/P (MAP) Pulse Ox O2 Delivery O2 Flow Rate FiO2


 


4/30/18 12:07 36.4 72 18 131/87 (102) 93   


 


4/30/18 11:05      Room Air  


 


4/30/18 07:43 36.4 105 18 152/106 (121) 94 Room Air  





    165/94 (117)    


 


4/30/18 00:10      Room Air  


 


4/29/18 23:10 36.6 77 20 127/84 (98) 90 Room Air  


 


4/29/18 19:56 36.7 76 20 131/81 (98) 90 Room Air  


 


4/29/18 16:00      Room Air  


 


4/29/18 14:56 36.6 84 20 113/80 (91) 92   











Physical Exam


General Appearance:  WD/WN, no apparent distress


Eyes:  normal inspection, sclerae normal


ENT:  hearing grossly normal


Neck:  supple


Respiratory/Chest:  normal breath sounds, no respiratory distress, no accessory 

muscle use


Cardiovascular:  regular rate, rhythm, + systolic murmur (3/6 heard best over 

sternal edge), + irregularly irregular, + pertinent finding (not tachycardic)


Abdomen:  normal bowel sounds, non tender, soft


Extremities:  no pedal edema, no calf tenderness


Neurologic/Psychiatric:  alert, normal mood/affect, oriented x 3


Skin:  normal color, warm/dry, no rash





Laboratory Results





Results Past 24 Hours








Test


  4/30/18


05:56 Range/Units


 


 


White Blood Count 14.25 4.8-10.8  K/uL


 


Red Blood Count 3.61 4.2-5.4  M/uL


 


Hemoglobin 12.1 12.0-16.0  g/dL


 


Hematocrit 36.0 37-47  %


 


Mean Corpuscular Volume 99.7   fL


 


Mean Corpuscular Hemoglobin 33.5 25-34  pg


 


Mean Corpuscular Hemoglobin


Concent 33.6


  32-36  g/dl


 


 


RDW Standard Deviation 52.8 36.4-46.3  fL


 


RDW Coefficient of Variation 14.6 11.5-14.5  %


 


Platelet Count 271 130-400  K/uL


 


Mean Platelet Volume 9.3 7.4-10.4  fL


 


Sodium Level 138 136-145  mmol/L


 


Potassium Level 3.6 3.5-5.1  mmol/L


 


Chloride Level 100   mmol/L


 


Carbon Dioxide Level 32 21-32  mmol/L


 


Anion Gap 6.0 3-11  mmol/L


 


Blood Urea Nitrogen 22 7-18  mg/dl


 


Creatinine


  0.66


  0.60-1.20


mg/dl


 


Est Creatinine Clear Calc


Drug Dose 94.3


   ml/min


 


 


Estimated GFR (


American) 109.0


   


 


 


Estimated GFR (Non-


American 94.0


   


 


 


BUN/Creatinine Ratio 33.4 10-20  


 


Random Glucose 85 70-99  mg/dl


 


Calcium Level 9.4 8.5-10.1  mg/dl











Assessment and Plan


62 year old female here for atrial flutter with rapid rate and hypotension





COPD exacerbation with acute hypoxic respiratory failure (mild obstructive 

disease on most recent PFTs, FEV1/FVC 65) - Completed course of azithromycin. 


- Saturating well on RA. O2 via NC if sats <92


- Continue PO prednisone 40mg daily and  Levalbuterol and Atrovent nebulizers





Atrial flutter/fibrillation (chronic) - Cardiology consulted given extensive 

cardiac history with a.fib mx and h/o endocarditis - recommendations 

appreciated. Serial trop x 3 negative


- Continue metoprolol 25mg BID + diltiazem 360mg daily. Target HR <100


- Anticoagulation with Xarelto





Alcohol abuse


- [facility] protocol for signs of withdrawal - no current s/sx of withdrawal


- PO supplementation of thiamine, folic acid and B12 (via multivitamin)


- Willing to participate in patient rehabilitation





Cardiac sounding syncope - Cardiology consulted - recommendations appreciated 


- S/p loop recorder placement





Hx fall with sacral fracture - CT head negative. CT shows <1cm displacement, no 

neurological deficit on exam. Orthopedics consulted - rec appreciated


- Pain management: acetaminophen, heat pack, lidocaine patch. Avoid NSAIDs due 

to Xarelto use


- PT/OT ordered


- Scheduled Colace to minimize straining which could exacerbate pain





Anxiety, Bipolar, depression


- Continue venlafaxine and Lamictal





HTN


- Continue metoprolol. Diltiazem added, as above. Losartan held due to elevated 

Cr and hypotension on arrival.





Tobacco abuse 


- Encourage cessation


- Nicotine patch ordered





Acute kidney injury - resolved





VTE Prophylaxis


- Anticoagulated with Xarelto





Code


- DNR 





Resident Physician Supervision Note:





I interviewed and examined the patient. Discussed with Dr. Herrera and agree with 

findings and plan as documented in the note. Any exceptions or clarifications 

are listed here: None





Documented By:  Joshua Lee


feeling better just hasn't gotten around much yet - wants to go straight to 

EtOH rehab


vitals noted nad breathing unlabored no pallor or icterus





COPD exacerbation - improving


afib - rate controlled


EtOH abuse - for rehab - ?hopefully on 4/31 if can be arranged


Continued [facility] stay due to:  other


Discharge planning:  rehab hospital





Resident Tracking


Resident Involvement:  Resident Care Provided


Care Provided:  Adult Hospital Medicine

## 2018-05-01 VITALS
DIASTOLIC BLOOD PRESSURE: 90 MMHG | TEMPERATURE: 98.06 F | OXYGEN SATURATION: 95 % | HEART RATE: 77 BPM | SYSTOLIC BLOOD PRESSURE: 136 MMHG

## 2018-05-01 VITALS
DIASTOLIC BLOOD PRESSURE: 80 MMHG | OXYGEN SATURATION: 94 % | SYSTOLIC BLOOD PRESSURE: 134 MMHG | HEART RATE: 80 BPM | TEMPERATURE: 98.06 F

## 2018-05-01 VITALS
SYSTOLIC BLOOD PRESSURE: 146 MMHG | TEMPERATURE: 98.42 F | OXYGEN SATURATION: 92 % | HEART RATE: 75 BPM | DIASTOLIC BLOOD PRESSURE: 85 MMHG

## 2018-05-01 VITALS — DIASTOLIC BLOOD PRESSURE: 85 MMHG | SYSTOLIC BLOOD PRESSURE: 135 MMHG | HEART RATE: 69 BPM

## 2018-05-01 VITALS
HEART RATE: 62 BPM | SYSTOLIC BLOOD PRESSURE: 119 MMHG | TEMPERATURE: 97.52 F | OXYGEN SATURATION: 96 % | DIASTOLIC BLOOD PRESSURE: 73 MMHG

## 2018-05-01 VITALS
TEMPERATURE: 98.24 F | HEART RATE: 76 BPM | OXYGEN SATURATION: 96 % | SYSTOLIC BLOOD PRESSURE: 113 MMHG | DIASTOLIC BLOOD PRESSURE: 74 MMHG

## 2018-05-01 VITALS
TEMPERATURE: 97.88 F | SYSTOLIC BLOOD PRESSURE: 150 MMHG | DIASTOLIC BLOOD PRESSURE: 100 MMHG | OXYGEN SATURATION: 94 % | HEART RATE: 91 BPM

## 2018-05-01 LAB
EOSINOPHIL NFR BLD AUTO: 292 K/UL (ref 130–400)
HCT VFR BLD CALC: 35.6 % (ref 37–47)
HGB BLD-MCNC: 12.2 G/DL (ref 12–16)
MCH RBC QN AUTO: 33.7 PG (ref 25–34)
MCHC RBC AUTO-ENTMCNC: 34.3 G/DL (ref 32–36)
MCV RBC AUTO: 98.3 FL (ref 80–100)
PMV BLD AUTO: 9.2 FL (ref 7.4–10.4)
RED CELL DISTRIBUTION WIDTH CV: 14.4 % (ref 11.5–14.5)
RED CELL DISTRIBUTION WIDTH SD: 51.5 FL (ref 36.4–46.3)
WBC # BLD AUTO: 11.1 K/UL (ref 4.8–10.8)

## 2018-05-01 RX ADMIN — VENLAFAXINE HYDROCHLORIDE SCH MG: 75 CAPSULE, EXTENDED RELEASE ORAL at 08:46

## 2018-05-01 RX ADMIN — FERROUS GLUCONATE SCH MG: 324 TABLET ORAL at 08:48

## 2018-05-01 RX ADMIN — FERROUS GLUCONATE SCH MG: 324 TABLET ORAL at 10:40

## 2018-05-01 RX ADMIN — Medication SCH TAB: at 08:46

## 2018-05-01 RX ADMIN — FERROUS GLUCONATE SCH MG: 324 TABLET ORAL at 18:05

## 2018-05-01 RX ADMIN — Medication SCH EA: at 21:00

## 2018-05-01 RX ADMIN — LIDOCAINE SCH PATCH: 50 PATCH CUTANEOUS at 08:47

## 2018-05-01 RX ADMIN — METOPROLOL SUCCINATE SCH MG: 25 TABLET, EXTENDED RELEASE ORAL at 21:05

## 2018-05-01 RX ADMIN — DILTIAZEM HYDROCHLORIDE SCH MG: 120 CAPSULE, COATED, EXTENDED RELEASE ORAL at 08:45

## 2018-05-01 RX ADMIN — ACETAMINOPHEN PRN MG: 500 TABLET, FILM COATED ORAL at 07:16

## 2018-05-01 RX ADMIN — RIVAROXABAN SCH MG: 20 TABLET, FILM COATED ORAL at 18:04

## 2018-05-01 RX ADMIN — METOPROLOL SUCCINATE SCH MG: 25 TABLET, EXTENDED RELEASE ORAL at 08:47

## 2018-05-01 RX ADMIN — ACETAMINOPHEN PRN MG: 500 TABLET, FILM COATED ORAL at 18:05

## 2018-05-01 RX ADMIN — DOCUSATE SODIUM SCH MG: 100 CAPSULE, LIQUID FILLED ORAL at 08:45

## 2018-05-01 RX ADMIN — Medication SCH MG: at 08:46

## 2018-05-01 RX ADMIN — NICOTINE SCH PATCH: 7 PATCH, EXTENDED RELEASE TRANSDERMAL at 08:47

## 2018-05-01 RX ADMIN — LOSARTAN POTASSIUM AND HYDROCHLOROTHIAZIDE SCH TAB: 12.5; 5 TABLET ORAL at 08:46

## 2018-05-01 RX ADMIN — DOCUSATE SODIUM SCH MG: 100 CAPSULE, LIQUID FILLED ORAL at 21:05

## 2018-05-01 RX ADMIN — GUAIFENESIN SCH MG: 600 TABLET, EXTENDED RELEASE ORAL at 08:47

## 2018-05-01 RX ADMIN — GUAIFENESIN SCH MG: 600 TABLET, EXTENDED RELEASE ORAL at 21:05

## 2018-05-01 RX ADMIN — ATORVASTATIN CALCIUM SCH MG: 40 TABLET, FILM COATED ORAL at 21:04

## 2018-05-01 NOTE — FAMILY MEDICINE PROGRESS NOTE
Progress Note


Date of Service


May 1, 2018.





Subjective


Pt evaluation today including:  conversation w/ patient, conversation w/ family

, physical exam, chart review, lab review, review of studies, review of 

inpatient medication list


Patient feels better, and is comfortable on room air. She notes improved 

strength and confidence on exertion. No exertional symptoms - no dizziness, 

chest pain, palpitations, or dyspnea. She continues to deny fevers/chills, 

headaches, abdominal pain, lower extremity swelling or rashes. Her sacral pain 

is intermittent and she has identified movements which trigger it and tries to 

avoid it. She is tolerating diet without nausea or vomiting, and voiding and 

stooling appropriately.





ROS is unremarkable except as noted above.





Objective


Vital Signs











  Date Time  Temp Pulse Resp B/P (MAP) Pulse Ox O2 Delivery O2 Flow Rate FiO2


 


5/1/18 11:21 36.7 77 18 136/90 (105) 95 Room Air  


 


5/1/18 10:11      Room Air  


 


5/1/18 07:42 36.6 91 18 150/100 (117) 94 Room Air  


 


5/1/18 04:00 36.7 80 20 134/80 (98) 94 Room Air  


 


5/1/18 00:20      Room Air  


 


5/1/18 00:00 36.9 75 20 146/85 (105) 92 Room Air  


 


4/30/18 19:22 36.5 72 22 129/75 (93) 91 Room Air  


 


4/30/18 16:00      Room Air  


 


4/30/18 15:54 36.6 91 20 134/83 (100) 94 Room Air  











Physical Exam


Notes:


General Appearance:  WD/WN, no apparent distress


Eyes:  normal inspection, sclerae normal


ENT:  hearing grossly normal


Neck:  supple


Respiratory/Chest:  normal breath sounds, no respiratory distress, no accessory 

muscle use


Cardiovascular:  regular rate, rhythm, + systolic murmur (3/6 heard best over 

sternal edge), + irregularly irregular, + pertinent finding (not tachycardic)


Abdomen:  normal bowel sounds, non tender, soft


Extremities:  no pedal edema, no calf tenderness


Neurologic/Psychiatric:  alert, normal mood/affect, oriented x 3


Skin:  normal color, warm/dry, no rash





Laboratory Results





Results Past 24 Hours








Test


  5/1/18


05:13 Range/Units


 


 


White Blood Count 11.10 4.8-10.8  K/uL


 


Red Blood Count 3.62 4.2-5.4  M/uL


 


Hemoglobin 12.2 12.0-16.0  g/dL


 


Hematocrit 35.6 37-47  %


 


Mean Corpuscular Volume 98.3   fL


 


Mean Corpuscular Hemoglobin 33.7 25-34  pg


 


Mean Corpuscular Hemoglobin


Concent 34.3


  32-36  g/dl


 


 


RDW Standard Deviation 51.5 36.4-46.3  fL


 


RDW Coefficient of Variation 14.4 11.5-14.5  %


 


Platelet Count 292 130-400  K/uL


 


Mean Platelet Volume 9.2 7.4-10.4  fL











Assessment and Plan


62 year old female here with chronic atrial flutter admitted with COPD 

exacerbation with acute hypoxic respiratory failure with recent syncopal episode





COPD exacerbation with acute hypoxic respiratory failure (mild obstructive 

disease on most recent PFTs, FEV1/FVC 65) - Completed course of azithromycin. 


- Saturating well on RA. O2 via NC if sats <92


- Continue PO prednisone 40mg daily and levalbuterol and Atrovent nebulizers





Atrial flutter/fibrillation (chronic) - Cardiology consulted given extensive 

cardiac history with a.fib mx and h/o endocarditis - recommendations 

appreciated. Serial trop x 3 negative


- Continue metoprolol 25mg BID (reduced to minimize pulm impact) + diltiazem 

360mg daily. Target HR <100


- Anticoagulation with Xarelto





Alcohol abuse


- [facility] protocol for signs of withdrawal - no current s/sx of withdrawal


- PO supplementation of thiamine, folic acid and B12 (via multivitamin)


- Willing to participate in patient rehabilitation





Cardiac sounding syncope - Cardiology consulted - recommendations appreciated 


- S/p loop recorder placement





Hx fall with sacral fracture - CT head negative. CT shows <1cm displacement, no 

neurological deficit on exam. Orthopedics consulted - rec appreciated


- Pain management: acetaminophen, heat pack, lidocaine patch. Avoid NSAIDs due 

to Xarelto use - can use intermittently with PO food and lots of water for 

intense pain


- Continue PT/OT


- Scheduled Colace to minimize straining which could exacerbate sacral pain





Anxiety, Bipolar, depression


- Continue venlafaxine and Lamictal





HTN


- Continue metoprolol. Diltiazem added, as above. Losartan held due to elevated 

Cr and hypotension on arrival.





Tobacco abuse 


- Encourage cessation


- Nicotine patch ordered





Acute kidney injury - resolved





VTE Prophylaxis


- Anticoagulated with Xarelto





Code


- DNR 





Resident Physician Supervision Note:





I interviewed and examined the patient. Discussed with Dr. Herrera and agree with 

findings and plan as documented in the note. Any exceptions or clarifications 

are listed here: None





Documented By:  Joshua eLe


feeling better walked around well no problems.  waiting on rehab matches. d/w 

case management multiple times through the day


vitals noted nad breathing unlabored no pallor or icterus





COPD exacerbation - improving, continue current treatment


afib - rate controlled, anticoagulated, continue current meds


EtOH abuse - for rehab once arranged


Continued [facility] stay due to:  other (awaiting placement)


Discharge planning:  rehab hospital





Resident Tracking


Resident Involvement:  Resident Care Provided


Care Provided:  Adult Hospital Medicine

## 2018-05-02 VITALS — HEART RATE: 90 BPM | SYSTOLIC BLOOD PRESSURE: 142 MMHG | DIASTOLIC BLOOD PRESSURE: 92 MMHG

## 2018-05-02 VITALS — DIASTOLIC BLOOD PRESSURE: 84 MMHG | OXYGEN SATURATION: 93 % | HEART RATE: 74 BPM | SYSTOLIC BLOOD PRESSURE: 130 MMHG

## 2018-05-02 VITALS
HEART RATE: 63 BPM | TEMPERATURE: 97.7 F | DIASTOLIC BLOOD PRESSURE: 82 MMHG | SYSTOLIC BLOOD PRESSURE: 153 MMHG | OXYGEN SATURATION: 96 %

## 2018-05-02 VITALS
OXYGEN SATURATION: 95 % | DIASTOLIC BLOOD PRESSURE: 103 MMHG | TEMPERATURE: 97.7 F | SYSTOLIC BLOOD PRESSURE: 162 MMHG | HEART RATE: 91 BPM

## 2018-05-02 VITALS
DIASTOLIC BLOOD PRESSURE: 86 MMHG | HEART RATE: 63 BPM | TEMPERATURE: 97.7 F | SYSTOLIC BLOOD PRESSURE: 153 MMHG | OXYGEN SATURATION: 96 %

## 2018-05-02 VITALS — HEART RATE: 72 BPM | SYSTOLIC BLOOD PRESSURE: 141 MMHG | DIASTOLIC BLOOD PRESSURE: 87 MMHG

## 2018-05-02 VITALS — OXYGEN SATURATION: 96 %

## 2018-05-02 RX ADMIN — GUAIFENESIN SCH MG: 600 TABLET, EXTENDED RELEASE ORAL at 08:27

## 2018-05-02 RX ADMIN — FERROUS GLUCONATE SCH MG: 324 TABLET ORAL at 08:28

## 2018-05-02 RX ADMIN — DILTIAZEM HYDROCHLORIDE SCH MG: 120 CAPSULE, COATED, EXTENDED RELEASE ORAL at 08:28

## 2018-05-02 RX ADMIN — DOCUSATE SODIUM SCH MG: 100 CAPSULE, LIQUID FILLED ORAL at 08:27

## 2018-05-02 RX ADMIN — METOPROLOL SUCCINATE SCH MG: 25 TABLET, EXTENDED RELEASE ORAL at 08:28

## 2018-05-02 RX ADMIN — DOCUSATE SODIUM SCH MG: 100 CAPSULE, LIQUID FILLED ORAL at 20:14

## 2018-05-02 RX ADMIN — METOPROLOL SUCCINATE SCH MG: 25 TABLET, EXTENDED RELEASE ORAL at 20:15

## 2018-05-02 RX ADMIN — GUAIFENESIN SCH MG: 600 TABLET, EXTENDED RELEASE ORAL at 20:14

## 2018-05-02 RX ADMIN — NICOTINE SCH PATCH: 7 PATCH, EXTENDED RELEASE TRANSDERMAL at 08:29

## 2018-05-02 RX ADMIN — LOSARTAN POTASSIUM AND HYDROCHLOROTHIAZIDE SCH TAB: 12.5; 5 TABLET ORAL at 08:28

## 2018-05-02 RX ADMIN — ACETAMINOPHEN PRN MG: 500 TABLET, FILM COATED ORAL at 14:54

## 2018-05-02 RX ADMIN — FERROUS GLUCONATE SCH MG: 324 TABLET ORAL at 12:00

## 2018-05-02 RX ADMIN — ATORVASTATIN CALCIUM SCH MG: 40 TABLET, FILM COATED ORAL at 20:14

## 2018-05-02 RX ADMIN — Medication SCH MG: at 08:28

## 2018-05-02 RX ADMIN — POLYETHYLENE GLYCOL 3350 PRN GM: 17 POWDER, FOR SOLUTION ORAL at 12:55

## 2018-05-02 RX ADMIN — FERROUS GLUCONATE SCH MG: 324 TABLET ORAL at 08:30

## 2018-05-02 RX ADMIN — RIVAROXABAN SCH MG: 20 TABLET, FILM COATED ORAL at 17:35

## 2018-05-02 RX ADMIN — VENLAFAXINE HYDROCHLORIDE SCH MG: 75 CAPSULE, EXTENDED RELEASE ORAL at 08:28

## 2018-05-02 RX ADMIN — ACETAMINOPHEN PRN MG: 500 TABLET, FILM COATED ORAL at 20:21

## 2018-05-02 RX ADMIN — Medication SCH EA: at 20:16

## 2018-05-02 RX ADMIN — LIDOCAINE SCH PATCH: 50 PATCH CUTANEOUS at 08:29

## 2018-05-02 RX ADMIN — FERROUS GLUCONATE SCH MG: 324 TABLET ORAL at 17:36

## 2018-05-02 RX ADMIN — Medication SCH TAB: at 08:28

## 2018-05-02 NOTE — FAMILY MEDICINE PROGRESS NOTE
Progress Note


Date of Service


May 2, 2018.





Subjective


Patient feels good, no acute events. She notes improved strength. She is very 

interested in getting to acute substance rehab, because she doesn't trust 

herself at home. No exertional symptoms - no dizziness, chest pain, palpitations

, or dyspnea. She continues to deny fevers/chills, headaches, abdominal pain, 

lower extremity swelling or rashes. Her sacral pain is intermittent and 

tolerable. She is tolerating diet without nausea or vomiting, and voiding and 

stooling appropriately.





ROS is unremarkable except as noted above.





Objective


Vital Signs











  Date Time  Temp Pulse Resp B/P (MAP) Pulse Ox O2 Delivery O2 Flow Rate FiO2


 


5/2/18 15:16      Room Air  


 


5/2/18 15:10  74 16 130/84 (99) 93 Room Air  


 


5/2/18 12:58 36.5 63 18  96 Nasal Cannula  


 


5/2/18 08:30     96 Room Air  


 


5/2/18 07:48 36.5 63 18 153/86 (108) 96 Room Air  


 


5/2/18 05:04  90  142/92 (109)    


 


5/2/18 03:45 36.5 91 18 162/103 (122) 95 Room Air  


 


5/1/18 23:40 36.8 76 20 113/74 (87) 96 Room Air  


 


5/1/18 23:28      Room Air  


 


5/1/18 21:02  69 16 135/85 (102)    











Physical Exam


Notes:


General Appearance:  WD/WN, no apparent distress


Eyes:  normal inspection, sclerae normal


ENT:  hearing grossly normal


Neck:  supple


Respiratory/Chest:  normal breath sounds, no respiratory distress, no accessory 

muscle use


Cardiovascular:  regular rate, rhythm, + systolic murmur (3/6 heard best over 

sternal edge), + irregularly irregular, + pertinent finding (not tachycardic)


Abdomen:  normal bowel sounds, non tender, soft


Extremities:  no pedal edema, no calf tenderness


Neurologic/Psychiatric:  alert, normal mood/affect, oriented x 3


Skin:  normal color, warm/dry, no rash





Assessment and Plan


62 year old female here with chronic atrial flutter admitted with COPD 

exacerbation with acute hypoxic respiratory failure with recent syncopal episode





COPD exacerbation with acute hypoxic respiratory failure (mild obstructive 

disease on most recent PFTs, FEV1/FVC 65) - Completed course of azithromycin 

and prednisone. 


- Saturating well on RA. O2 via NC if sats <92


- Levalbuterol and Atrovent nebulizers





Atrial flutter/fibrillation (chronic) - Cardiology consulted given extensive 

cardiac history with prateek mx and h/o endocarditis - recommendations 

appreciated. Serial trop x 3 negative


- Continue metoprolol 25mg BID + diltiazem 360mg daily. Target HR <100


- Anticoagulation with Xarelto





Alcohol abuse


- [facility] protocol for signs of withdrawal - no current s/sx of withdrawal


- PO supplementation of thiamine, folic acid and B12 (via multivitamin)


- Willing to participate in patient rehabilitation





Cardiac sounding syncope - Cardiology consulted - recommendations appreciated 


- S/p loop recorder placement





Hx fall with sacral fracture - CT head negative. CT shows <1cm displacement, no 

neurological deficit on exam. Orthopedics consulted - rec appreciated


- Pain management: acetaminophen, heat pack, lidocaine patch. Avoid NSAIDs due 

to Xarelto use - can use intermittently with PO food and lots of water for 

intense pain


- Continue PT/OT


- Scheduled Colace to minimize straining which could exacerbate sacral pain





Anxiety, Bipolar, depression


- Continue venlafaxine and Lamictal





HTN


- Continue metoprolol. Diltiazem added, as above. Losartan held due to elevated 

Cr and hypotension on arrival.





Tobacco abuse 


- Encourage cessation


- Nicotine patch ordered





Acute kidney injury - resolved





VTE Prophylaxis


- Anticoagulated with Xarelto





Code


- DNR 





Resident Physician Supervision Note:





I interviewed and examined the patient. Discussed with Dr. Herrera and agree with 

findings and plan as documented in the note. Any exceptions or clarifications 

are listed here: None





Documented By:  Joshua Lee


feeling the same hoping for rehab placmeent, sister taking a very active role 

in helping coordinate too


vitals noted nad breathing unlabored no pallor or icterus





COPD exacerbation - improving, finish steroids, continue current inhalers


afib - rate controlled, anticoagulated, stable on current meds


EtOH abuse - for rehab once arranged, no active withdrawal now


Discharge planning:  other (alcohol rehab)





Resident Tracking


Resident Involvement:  Resident Care Provided


Care Provided:  Adult Hospital Medicine

## 2018-05-03 VITALS
OXYGEN SATURATION: 96 % | HEART RATE: 54 BPM | SYSTOLIC BLOOD PRESSURE: 110 MMHG | TEMPERATURE: 97.52 F | DIASTOLIC BLOOD PRESSURE: 69 MMHG

## 2018-05-03 VITALS
HEART RATE: 82 BPM | DIASTOLIC BLOOD PRESSURE: 100 MMHG | SYSTOLIC BLOOD PRESSURE: 151 MMHG | OXYGEN SATURATION: 95 % | TEMPERATURE: 97.52 F

## 2018-05-03 VITALS
DIASTOLIC BLOOD PRESSURE: 77 MMHG | HEART RATE: 69 BPM | SYSTOLIC BLOOD PRESSURE: 125 MMHG | OXYGEN SATURATION: 97 % | TEMPERATURE: 97.7 F

## 2018-05-03 VITALS
TEMPERATURE: 98.42 F | SYSTOLIC BLOOD PRESSURE: 127 MMHG | OXYGEN SATURATION: 95 % | HEART RATE: 61 BPM | DIASTOLIC BLOOD PRESSURE: 70 MMHG

## 2018-05-03 VITALS — HEART RATE: 69 BPM | OXYGEN SATURATION: 98 %

## 2018-05-03 VITALS — HEART RATE: 77 BPM | DIASTOLIC BLOOD PRESSURE: 78 MMHG | SYSTOLIC BLOOD PRESSURE: 114 MMHG

## 2018-05-03 RX ADMIN — LIDOCAINE SCH PATCH: 50 PATCH CUTANEOUS at 07:58

## 2018-05-03 RX ADMIN — FERROUS GLUCONATE SCH MG: 324 TABLET ORAL at 12:00

## 2018-05-03 RX ADMIN — ATORVASTATIN CALCIUM SCH MG: 40 TABLET, FILM COATED ORAL at 21:26

## 2018-05-03 RX ADMIN — DOCUSATE SODIUM SCH MG: 100 CAPSULE, LIQUID FILLED ORAL at 21:24

## 2018-05-03 RX ADMIN — VENLAFAXINE HYDROCHLORIDE SCH MG: 75 CAPSULE, EXTENDED RELEASE ORAL at 07:55

## 2018-05-03 RX ADMIN — METOPROLOL SUCCINATE SCH MG: 25 TABLET, EXTENDED RELEASE ORAL at 21:59

## 2018-05-03 RX ADMIN — GUAIFENESIN SCH MG: 600 TABLET, EXTENDED RELEASE ORAL at 21:25

## 2018-05-03 RX ADMIN — GUAIFENESIN SCH MG: 600 TABLET, EXTENDED RELEASE ORAL at 07:55

## 2018-05-03 RX ADMIN — POLYETHYLENE GLYCOL 3350 PRN GM: 17 POWDER, FOR SOLUTION ORAL at 15:45

## 2018-05-03 RX ADMIN — ACETAMINOPHEN PRN MG: 500 TABLET, FILM COATED ORAL at 15:41

## 2018-05-03 RX ADMIN — LOSARTAN POTASSIUM AND HYDROCHLOROTHIAZIDE SCH TAB: 12.5; 5 TABLET ORAL at 07:55

## 2018-05-03 RX ADMIN — FERROUS GLUCONATE SCH MG: 324 TABLET ORAL at 07:57

## 2018-05-03 RX ADMIN — LEVALBUTEROL PRN MG: 1.25 SOLUTION, CONCENTRATE RESPIRATORY (INHALATION) at 16:28

## 2018-05-03 RX ADMIN — IPRATROPIUM BROMIDE PRN MG: 0.5 SOLUTION RESPIRATORY (INHALATION) at 16:28

## 2018-05-03 RX ADMIN — Medication SCH MG: at 07:57

## 2018-05-03 RX ADMIN — Medication SCH EA: at 21:24

## 2018-05-03 RX ADMIN — FERROUS GLUCONATE SCH MG: 324 TABLET ORAL at 15:41

## 2018-05-03 RX ADMIN — NICOTINE SCH PATCH: 7 PATCH, EXTENDED RELEASE TRANSDERMAL at 07:58

## 2018-05-03 RX ADMIN — Medication SCH TAB: at 07:54

## 2018-05-03 RX ADMIN — ACETAMINOPHEN PRN MG: 500 TABLET, FILM COATED ORAL at 08:05

## 2018-05-03 RX ADMIN — DOCUSATE SODIUM SCH MG: 100 CAPSULE, LIQUID FILLED ORAL at 07:56

## 2018-05-03 RX ADMIN — RIVAROXABAN SCH MG: 20 TABLET, FILM COATED ORAL at 16:48

## 2018-05-03 RX ADMIN — DILTIAZEM HYDROCHLORIDE SCH MG: 120 CAPSULE, COATED, EXTENDED RELEASE ORAL at 07:57

## 2018-05-03 RX ADMIN — METOPROLOL SUCCINATE SCH MG: 25 TABLET, EXTENDED RELEASE ORAL at 07:54

## 2018-05-03 NOTE — DISCHARGE SUMMARY
Discharge Summary


Date of Service


May 4, 2018.





Discharge Summary


Admission Date:


Apr 24, 2018 at 19:00


Discharge Date:  May 4, 2018


Discharge Disposition:  Rehab (substance abuse)


Principal Diagnosis:  COPD exacerbation


Problems/Secondary Diagnoses:


Chronic atrial fibrillation. Alcohol withdrawal.


Immunizations:  


   Have You Had Influenza Vaccine:  Yes


   History of Tetanus Vaccine?:  No


   History of Pneumococcal:  No


   History of Hepatitis B Vaccine:  No


Consultations:


Cardiology.





Medication Reconciliation


New Medications:  


Albuterol Hfa (Ventolin Hfa) 200 Puffs/81385 Mcg Aers


2-4 PUFFS INH Q6H for SOB/Wheezing, #1 INHALER





Diltiazem HCl (Diltiazem HCl ER) 120 Mg Caper


360 MG PO QAM, #90 TAB 1 Refill





Metoprolol Succinate (Metoprolol Succinate ER) 25 Mg Tabcr


50 MG PO DAILY, #60 TAB 2 Refills





Multiple Vitamin (Daily-Jasper) 1 Tab Tab


1 TAB PO QAM, #30 TAB 1 Refill





 


Continued Medications:  


Acetaminophen (Tylenol) 325 Mg Tab


650 MG PO Q6 PRN for Pain, TAB





Amoxicillin (Amoxil) 250 Mg Cap


1 CAP PO TID for 1hr before denist appointment  for 7 Days, #21 CAP





Aspirin (Aspirin Ec) 81 Mg Tab


81 MG PO QAM





Atorvastatin (Lipitor) 40 Mg Tab


40 MG PO HS, TAB





Docusate Sodium (Docusate Sodium) 100 Mg Cap


1 CAP PO DAILY for 30 Days, #30 CAP





Ferrous Gluconate (Ferrous Gluconate) 324 Mg Tab


324 MG PO TIDM for 30 Days, TAB





Furosemide (Furosemide) 20 Mg Tab


20 MG PO DAILY PRN for edema, #30





Hctz/Losartan (Hyzaar 25MG/100MG)  Tab


1 TAB PO DAILY, #30 TAB





Lamotrigine (Lamotrigine) 100 Mg Tab


100 MG PO HS





Levetiracetam (Keppra) 250 Mg Tab


500 MG PO BID, TAB





Meclizine Hcl (Meclizine Hcl) 25 Mg Tab


1 TAB PO TID for 30 Days, #90 TAB





Rivaroxaban (Xarelto) 20 Mg Tab


1 TAB PO DAILY for 30 Days, #30 TAB 11 Refills





Venlafaxine HCl (Venlafaxine HCl ER) 75 Mg Capcr


75 MG PO QAM





 


Discontinued Medications:  


Metoprolol Succ (Toprol Xl) (Toprol-Xl) 25 Mg Tabcr


25 MG PO DAILY, #30 TAB











Discharge Exam


Patient well. She is keen for discharge.





All systems were reviewed and were otherwise negative.


Physical Exam:  


   General Appearance:  WD/WN, no apparent distress


   Eyes:  sclerae normal


   ENT:  hearing grossly normal


   Neck:  supple


   Respiratory/Chest:  normal breath sounds, no respiratory distress, no 

accessory muscle use


   Cardiovascular:  + systolic murmur (at apex), + irregularly irregular


   Abdomen / GI:  normal bowel sounds, non tender, soft


   Extremities:  no calf tenderness, no pedal edema


   Neurologic/Psychiatric:  alert, normal mood/affect, oriented x 3


   Skin:  normal color, warm/dry, no rash





Hospital Course


62 year old female here with chronic atrial flutter admitted with COPD 

exacerbation with acute hypoxic respiratory failure with recent syncopal episode





COPD exacerbation with acute hypoxic respiratory failure (mild obstructive 

disease on most recent PFTs, FEV1/FVC 65) 


- Completed course of azithromycin. Completed course of steroids. Now 

saturating well on RA.


- Discharged with albuterol inhaler PRN SOB or wheezing





Atrial flutter/fibrillation (chronic) 


- Cardiology consulted given extensive cardiac history with prateek mx and h/o 

endocarditis. Serial trop x 3 negative


- Continue metoprolol 25mg BID + diltiazem 360mg daily. Target HR <100. 

Continue anticoagulation with Xarelto


- Keep follow up appointment with cardiologist, Dr. Leiva





Alcohol abuse


- [facility] protocol initiated for signs of withdrawal early in admission. Resolved. 

Patient looking to participate in substance use rehabilitation





Cardiac sounding syncope - Cardiology consulted. S/p loop recorder placement (4/ 26)


- Follow up with cardiologist Dr. Kocher in 1 month





Hx fall with sacral fracture - CT head negative. CT shows <1cm displacement, no 

neurological deficit on exam. Orthopedics consulted


- Pain management: acetaminophen, heat pack. Intermittent NSAID use permitted 

for breakthrough pain - take with PO food and lots of water


- Colace prescribed to minimize straining which could exacerbate sacral pain. 

Recommend continue PT/OT. Follow up with ortho.





Anxiety, Bipolar, depression


- Continue venlafaxine and Lamictal





HTN


- Continue metoprolol. Diltiazem added, as above, and losartan





Tobacco abuse 


- Encourage cessation.





Acute kidney injury - resolved





VTE Prophylaxis


- Anticoagulated with Xarelto





Code


- DNR 





Resident Physician Supervision Note:





I interviewed and examined the patient. Discussed with Dr. Barr and agree 

with findings and plan as documented in the note. Any exceptions or 

clarifications are listed here: None





Documented By:  Joshua Lee


feeling good ready to go home


discussed coping strategies, distraction strategies, strategies to enhance her 

chances to stay sober


vitals noted nad breathing unlabored





alcohol abuse - otupt rehab pending placmeent at inpt, has multiple strategies 

now


COPD - improved, as above


afib - stable, as above


Total Time Spent:  Less than 30 minutes


This includes examination of the patient, discharge planning, medication 

reconciliation, and communication with other providers.





Discharge Instructions


Please refer to the electronic Patient Visit Report (Discharge Instructions) 

for additional information.





Additional Copies To


Sven Osborne M.D.





Resident Tracking


Resident Involvement:  Resident Care Provided


Care Provided:  Adult Hospital Medicine

## 2018-05-03 NOTE — ORTHOPEDIC PROGRESS NOTE
Orthopedic Progress Note


Date of Service


May 3, 2018.





Subjective


Post OP Day:  


Reports: feeling well, Denies: complaints, chest pain, nausea / vomiting, light 

headedness


Additional Notes:


Patient was evaluated on fourth floor today.  She is approximately 8 days out 

from her nondisplaced pelvic fracture.  She states she has some soreness in the 

mornings but otherwise feels pretty well.  She has been participating in 

physical therapy daily.  She states she walked all the way around [place] 

[place] yesterday.  She states the right shoulder and elbow no longer bother 

her.  She has been able to move them without difficulty.  She is ready for 

discharge and is just waiting on a bed to become available at an alcohol rehab 

facility.  She is quite reggie about this.  She states that she cannot go home 

as the temptation to start drinking again would be too much.  She feels she 

needs to go directly to a rehab.  She is hoping for departure today or tomorrow.





Objective


Patient is sitting in bed eating breakfast.  She has no significant discomfort 

with motion of her lower extremities.  This does not increase her sacral 

discomfort.  She has supple motion of the right shoulder and elbow actively.  

She denies any discomfort with this.  Intact overhead reach.


Neurologic: Gross sensation is intact across the lower extremities by soft 

touch.











  Date Time  Temp Pulse Resp B/P (MAP) Pulse Ox O2 Delivery O2 Flow Rate FiO2


 


5/3/18 08:10      Room Air  


 


5/3/18 07:22 36.4 82 18 151/100 (117) 95 Room Air  


 


5/3/18 00:11 36.9 61 20 127/70 (89) 95 Room Air  


 


5/3/18 00:04      Room Air  


 


5/2/18 21:12      Room Air  


 


5/2/18 20:10  72  141/87 (105)    


 


5/2/18 15:16      Room Air  


 


5/2/18 15:10  74 16 130/84 (99) 93 Room Air  


 


5/2/18 12:58 36.5 63 18  96 Nasal Cannula  











Assessment & Plan


Assessment:


1.  Right elbow pain resolved


2.  Nondisplaced sacral fracture


Plan:


Cont oral pain meds as needed for control


WBAT


Inpatient PT/OT


She will need a new x-ray of her pelvis and sacrum in approximately 2 weeks.  

If she is in a local facility at that time, she may follow-up in the office.  

If she is in the area, films may be done locally and we can have the results 

sent to us.


We will continue to monitor the right shoulder and elbow in case symptoms recur.


Recommend f/u at our clinic after discharge.





Please recall if there are any orthopedic issues








Discharge Planning


Discharge Planning:  other (alcohol rehab)


Pain Management:  PO Tylenol


DVT Prophylaxis:  Xarelto (chronic)

## 2018-05-03 NOTE — FAMILY MEDICINE PROGRESS NOTE
Progress Note


Date of Service


May 3, 2018.





Subjective


Pt evaluation today including:  conversation w/ patient, physical exam, chart 

review


Patient feels good, no acute events. She notes improved strength. She is bored 

but continues to request transfer to acute substance rehab, because she doesn't 

trust herself at home. No exertional symptoms - no dizziness, chest pain, 

palpitations, or dyspnea. No fevers/chills, headaches, abdominal pain, lower 

extremity swelling or rashes. Her sacral pain is intermittent and tolerable. 

She is tolerating diet without nausea or vomiting, and voiding and stooling 

appropriately.





ROS is unremarkable except as noted above.





Objective


Vital Signs











  Date Time  Temp Pulse Resp B/P (MAP) Pulse Ox O2 Delivery O2 Flow Rate FiO2


 


5/3/18 16:29  69 16  98 Room Air 2.0 


 


5/3/18 16:00      Room Air  


 


5/3/18 15:02 36.4 54 22 110/69 (83) 96 Room Air  


 


5/3/18 08:10      Room Air  


 


5/3/18 07:22 36.4 82 18 151/100 (117) 95 Room Air  


 


5/3/18 00:11 36.9 61 20 127/70 (89) 95 Room Air  


 


5/3/18 00:04      Room Air  


 


5/2/18 21:12      Room Air  


 


5/2/18 20:10  72  141/87 (105)    











Physical Exam


Notes:


General Appearance:  WD/WN, no apparent distress


Eyes:  normal inspection, sclerae normal


ENT:  hearing grossly normal


Neck:  supple


Respiratory/Chest:  normal breath sounds, no respiratory distress, no accessory 

muscle use


Cardiovascular:  regular rate, rhythm, + systolic murmur (3/6 heard best over 

sternal edge), + irregularly irregular, + pertinent finding (not tachycardic)


Abdomen:  normal bowel sounds, non tender, soft


Extremities:  no pedal edema, no calf tenderness


Neurologic/Psychiatric:  alert, normal mood/affect, oriented x 3


Skin:  normal color, warm/dry, no rash





Assessment and Plan


62 year old female here with chronic atrial flutter admitted with COPD 

exacerbation with acute hypoxic respiratory failure with recent syncopal episode





COPD exacerbation with acute hypoxic respiratory failure (mild obstructive 

disease on most recent PFTs, FEV1/FVC 65) - Completed course of azithromycin 

and prednisone. 


- Saturating well on RA. O2 via NC if sats <92


- Nebs discontinued. Albuterol inhaler PRN SOB/wheezing





Atrial flutter/fibrillation (chronic) - Cardiology consulted given extensive 

cardiac history with a.fib mx and h/o endocarditis - recommendations 

appreciated. Serial trop x 3 negative


- Continue metoprolol 25mg BID + diltiazem 360mg daily. Target HR <100 is being 

achieved


- Anticoagulation with Xarelto





Alcohol abuse


- AWSS protocol for signs of withdrawal - no current s/sx of withdrawal


- PO supplementation of thiamine, folic acid and multivitamin


- Willing to participate in patient rehabilitation





Cardiac sounding syncope - Cardiology consulted - recommendations appreciated 


- S/p loop recorder placement





Hx fall with sacral fracture - CT head negative. CT shows <1cm displacement, no 

neurological deficit on exam. Orthopedics consulted - rec appreciated


- Pain management: acetaminophen, heat pack, lidocaine patch. Avoid NSAIDs due 

to Xarelto use - can use intermittently with PO food and lots of water for 

intense pain


- Continue PT/OT


- Scheduled Colace to minimize straining which could exacerbate sacral pain





Anxiety, Bipolar, depression


- Continue venlafaxine and Lamictal





HTN


- Continue metoprolol. Diltiazem added, as above. Losartan held due to elevated 

Cr and hypotension on arrival.





Tobacco abuse 


- Encourage cessation


- Nicotine patch ordered





Acute kidney injury - resolved





VTE Prophylaxis


- Anticoagulated with Xarelto





Code


- DNR 





Resident Physician Supervision Note:





I interviewed and examined the patient. Discussed with Dr. Herrera and agree with 

findings and plan as documented in the note. Any exceptions or clarifications 

are listed here: None





Documented By:  Joshua Lee


still waiting on rehab, no new complaints


vitals noted nad breathing unlabored no pallor or icterus





COPD exacerbation - improving, slow taper steroids, continue current inhalers


afib - rate controlled, anticoagulated, continue current meds


EtOH abuse - for rehab once arranged, no active withdrawal currently


Discharge planning:  other (alcohol rehab)





Resident Tracking


Resident Involvement:  Resident Care Provided


Care Provided:  Adult Hospital Medicine

## 2018-05-04 VITALS
OXYGEN SATURATION: 97 % | DIASTOLIC BLOOD PRESSURE: 82 MMHG | TEMPERATURE: 97.52 F | SYSTOLIC BLOOD PRESSURE: 138 MMHG | HEART RATE: 89 BPM

## 2018-05-04 VITALS
TEMPERATURE: 97.88 F | HEART RATE: 72 BPM | OXYGEN SATURATION: 97 % | SYSTOLIC BLOOD PRESSURE: 108 MMHG | DIASTOLIC BLOOD PRESSURE: 74 MMHG

## 2018-05-04 VITALS — OXYGEN SATURATION: 97 %

## 2018-05-04 RX ADMIN — NICOTINE SCH PATCH: 7 PATCH, EXTENDED RELEASE TRANSDERMAL at 07:44

## 2018-05-04 RX ADMIN — DILTIAZEM HYDROCHLORIDE SCH MG: 120 CAPSULE, COATED, EXTENDED RELEASE ORAL at 07:44

## 2018-05-04 RX ADMIN — FERROUS GLUCONATE SCH MG: 324 TABLET ORAL at 12:00

## 2018-05-04 RX ADMIN — DOCUSATE SODIUM SCH MG: 100 CAPSULE, LIQUID FILLED ORAL at 12:25

## 2018-05-04 RX ADMIN — METOPROLOL SUCCINATE SCH MG: 25 TABLET, EXTENDED RELEASE ORAL at 07:43

## 2018-05-04 RX ADMIN — VENLAFAXINE HYDROCHLORIDE SCH MG: 75 CAPSULE, EXTENDED RELEASE ORAL at 09:29

## 2018-05-04 RX ADMIN — POLYETHYLENE GLYCOL 3350 PRN GM: 17 POWDER, FOR SOLUTION ORAL at 06:35

## 2018-05-04 RX ADMIN — Medication SCH MG: at 07:45

## 2018-05-04 RX ADMIN — ACETAMINOPHEN PRN MG: 500 TABLET, FILM COATED ORAL at 06:35

## 2018-05-04 RX ADMIN — LIDOCAINE SCH PATCH: 50 PATCH CUTANEOUS at 07:45

## 2018-05-04 RX ADMIN — Medication SCH TAB: at 09:28

## 2018-05-04 RX ADMIN — FERROUS GLUCONATE SCH MG: 324 TABLET ORAL at 07:45

## 2018-05-04 RX ADMIN — LOSARTAN POTASSIUM AND HYDROCHLOROTHIAZIDE SCH TAB: 12.5; 5 TABLET ORAL at 07:44

## 2018-05-04 RX ADMIN — GUAIFENESIN SCH MG: 600 TABLET, EXTENDED RELEASE ORAL at 09:29

## 2018-07-26 ENCOUNTER — HOSPITAL ENCOUNTER (OUTPATIENT)
Dept: HOSPITAL 45 - C.RDSM | Age: 63
Discharge: HOME | End: 2018-07-26
Attending: PHYSICIAN ASSISTANT
Payer: COMMERCIAL

## 2018-07-26 DIAGNOSIS — S82.831D: Primary | ICD-10-CM
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
## 2018-07-26 NOTE — DIAGNOSTIC IMAGING REPORT
RIGHT ANKLE 3 VIEWS



HISTORY:      DISPLACED FX OF LATERAL MALLEOLUS OF RIGHT FIBULA



COMPARISON: Right ankle 6/29/2018.



FINDINGS: There is progressive bony bridging within the oblique fracture of the

distal right fibula. This is consistent with healing. No significant

displacement. No dislocation. Mild soft tissue swelling within the right ankle

persists. Osteochondral defect seen within the medial talar dome is again noted.

No radiopaque foreign bodies.



IMPRESSION:  



1. Nondisplaced healing distal right fibular fracture.

2. Soft tissue swelling persists.







Electronically signed by:  Austyn Weston M.D.

7/26/2018 2:40 PM



Dictated Date/Time:  7/26/2018 2:39 PM

## 2018-08-17 NOTE — DIAGNOSTIC IMAGING REPORT
R SHOULDER MIN 2 VIEWS



CLINICAL HISTORY: 62 years-old Female presenting with RIGHT SHOULDER PAIN. 



TECHNIQUE: Frontal, transscapular Y, and axillary views of the right shoulder

were obtained. 



COMPARISON: Comparison made to chest x-ray from 4/24/2018.



FINDINGS:

Interval development of significant subchondral sclerosis and cystic change,

which certainly involves the humeral head and may also involve the glenoid. This

was not apparent on the prior exam though mild degenerative changes were

evident. No subluxation of the humeral head. Mild subcortical collapse

suggested. No acute fracture or malalignment. No radiographic soft tissue

abnormality.



IMPRESSION:

Rapidly progressive degenerative changes of the right glenohumeral joint, which

would be somewhat unexpected for conventional osteoarthritis. Alternatively, a

component of osteonecrosis and/or septic arthritis are considered less likely

though difficult to exclude. Further evaluation with MR to be considered.







Electronically signed by:  Eddie Glass M.D.

8/17/2018 10:51 AM



Dictated Date/Time:  8/17/2018 10:47 AM

## 2018-08-17 NOTE — DIAGNOSTIC IMAGING REPORT
R ANKLE MIN 3 VIEWS



CLINICAL HISTORY: 62 years-old Female presenting with F/U RIGHT ANKLE FX. 



TECHNIQUE: Frontal, mortise, and lateral views of the right ankle were obtained.





COMPARISON: 7/26/2018.



FINDINGS:

Previously demonstrated nondisplaced distal fibular fracture remains evident.

There is suggestion of sclerosis on the apposing sides of the fracture plane. No

evidence of bridging callus formation. Overall mild diastases at the fracture

plane is unchanged. No angulation. Osteopenia. Mild diffuse soft tissue

swelling. Ankle joint effusion suspected.



IMPRESSION:

Unchanged mild diastases at the chronic distal fibular fracture. Persistent

nonunion. No change in alignment.







Electronically signed by:  Eddie Glass M.D.

8/17/2018 10:10 AM



Dictated Date/Time:  8/17/2018 10:08 AM

## 2018-08-19 ENCOUNTER — HOSPITAL ENCOUNTER (OUTPATIENT)
Dept: HOSPITAL 45 - C.RDSM | Age: 63
Discharge: HOME | End: 2018-08-19
Attending: PHYSICIAN ASSISTANT
Payer: COMMERCIAL

## 2018-08-19 DIAGNOSIS — S82.831A: ICD-10-CM
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
## 2018-08-19 DIAGNOSIS — M25.511: Primary | ICD-10-CM

## 2023-08-19 NOTE — DISCHARGE INSTRUCTIONS
Discharge Instructions


Date of Service


May 4, 2018.





Admission


Reason for Admission:  Atrial Flutter With Rvr, Sacral Fracture





Discharge


Discharge Diagnosis / Problem:  COPD exacerbation, alcohol withdrawal





Discharge Goals


Goal(s):  Decrease discomfort, Diagnostic testing, Therapeutic intervention





Activity Recommendations


Activity Limitations:  resume your previous activity





.





Instructions / Follow-Up


Instructions / Follow-Up


You were admitted to hospital with worsening of your breathing and pain 

secondary to a recent fall after blacking out.





Your COPD was treated with breathing treatments and steroids, and now you are 

breathing more comfortably without needing oxygen. On discharge, you have been 

provided with an inhaler to use whenever you are feeling short of breath with 

wheezing. 





You were also found to have a very fast heart rate on admission. Your 

metoprolol dose was increased and a new heart rate controlling medication, 

diltiazem was added. Continue these medications as currently prescribed until 

further discussion with Dr. Leiva at your previously scheduled  follow up 

appointment. If you have new symptoms, discussion with your PCP or Dr. Leiva is warranted sooner. 





Because you have had multiple episodes of blacking out, during this admission a 

loop recorder was inserted into your chest. Dr. Kocher will see you in 1 month 

to assess and discuss any findings.





For control of pain from your recent fall, use Tylenol up to a maximum of 

4000mg daily. Use of NSAIDs (ibuprofen, naproxen, Motrin, Aleve etc.) should be 

limited but can be used intermittently for break through pain. Follow up with 

your orthopedic specialist as needed.





Continue all other medications as previous.





Thank you for allowing us to participate in your care.





Current Hospital Diet


Patient's current hospital diet: AHA Diet (Heart Healthy), Low Sodium Diet (2gm 

Na)





Discharge Diet


Recommended Diet:  AHA Diet (Heart Healthy), Low Sodium Diet (2gm Na)





Procedures


Procedures Performed:  


Loop recorder insertion





Pending Studies


Studies pending at discharge:  no





Medical Emergencies








.


Who to Call and When:





Medical Emergencies:  If at any time you feel your situation is an emergency, 

please call 911 immediately.





.





Non-Emergent Contact


Non-Emergency issues call your:  Primary Care Provider, Cardiologist





.


.








"Provider Documentation" section prepared by Chiquita Herrera.








.





Resident Tracking


Resident Involvement:  Resident Care Provided


Care Provided:  Adult [facility] Medicine
4 (moderate pain)